# Patient Record
Sex: MALE | Race: WHITE | NOT HISPANIC OR LATINO | Employment: OTHER | URBAN - METROPOLITAN AREA
[De-identification: names, ages, dates, MRNs, and addresses within clinical notes are randomized per-mention and may not be internally consistent; named-entity substitution may affect disease eponyms.]

---

## 2017-04-03 ENCOUNTER — TELEPHONE (OUTPATIENT)
Dept: NEUROSURGERY | Facility: CLINIC | Age: 67
End: 2017-04-03

## 2017-04-03 ENCOUNTER — OFFICE VISIT (OUTPATIENT)
Dept: NEUROSURGERY | Facility: CLINIC | Age: 67
End: 2017-04-03
Payer: MEDICARE

## 2017-04-03 VITALS
WEIGHT: 177.63 LBS | SYSTOLIC BLOOD PRESSURE: 116 MMHG | DIASTOLIC BLOOD PRESSURE: 76 MMHG | HEIGHT: 72 IN | TEMPERATURE: 98 F | BODY MASS INDEX: 24.06 KG/M2 | HEART RATE: 60 BPM

## 2017-04-03 DIAGNOSIS — M48.061 LUMBAR STENOSIS: Primary | ICD-10-CM

## 2017-04-03 DIAGNOSIS — M47.22 CERVICAL SPONDYLOSIS WITH RADICULOPATHY: ICD-10-CM

## 2017-04-03 DIAGNOSIS — M47.26 OSTEOARTHRITIS OF SPINE WITH RADICULOPATHY, LUMBAR REGION: ICD-10-CM

## 2017-04-03 PROBLEM — M47.816 SPONDYLOSIS OF LUMBAR REGION WITHOUT MYELOPATHY OR RADICULOPATHY: Status: ACTIVE | Noted: 2017-04-03

## 2017-04-03 PROCEDURE — 99204 OFFICE O/P NEW MOD 45 MIN: CPT | Mod: S$PBB,,, | Performed by: NEUROLOGICAL SURGERY

## 2017-04-03 PROCEDURE — 99203 OFFICE O/P NEW LOW 30 MIN: CPT | Mod: PBBFAC | Performed by: NEUROLOGICAL SURGERY

## 2017-04-03 PROCEDURE — 99999 PR PBB SHADOW E&M-NEW PATIENT-LVL III: CPT | Mod: PBBFAC,,, | Performed by: NEUROLOGICAL SURGERY

## 2017-04-03 RX ORDER — MELOXICAM 15 MG/1
15 TABLET ORAL DAILY
COMMUNITY

## 2017-04-03 RX ORDER — DICLOFENAC SODIUM 10 MG/G
4 GEL TOPICAL 4 TIMES DAILY PRN
COMMUNITY

## 2017-04-03 RX ORDER — PRAZOSIN HYDROCHLORIDE 2 MG/1
1 CAPSULE ORAL 2 TIMES DAILY
COMMUNITY
End: 2018-03-29

## 2017-04-03 RX ORDER — GABAPENTIN 300 MG/1
300 CAPSULE ORAL 3 TIMES DAILY
COMMUNITY

## 2017-04-03 RX ORDER — CLONAZEPAM 1 MG/1
1 TABLET ORAL 2 TIMES DAILY PRN
COMMUNITY

## 2017-04-03 RX ORDER — ESCITALOPRAM OXALATE 10 MG/1
10 TABLET ORAL DAILY
COMMUNITY

## 2017-04-03 RX ORDER — HYDROCODONE BITARTRATE AND ACETAMINOPHEN 10; 325 MG/1; MG/1
1 TABLET ORAL
COMMUNITY

## 2017-04-03 RX ORDER — CLOTRIMAZOLE AND BETAMETHASONE DIPROPIONATE 10; .5 MG/ML; MG/ML
LOTION TOPICAL
COMMUNITY
End: 2018-09-08

## 2017-04-03 RX ORDER — ATORVASTATIN CALCIUM 40 MG/1
40 TABLET, FILM COATED ORAL DAILY
COMMUNITY

## 2017-04-03 NOTE — PROGRESS NOTES
CHIEF COMPLAINT:  Low back pain and neck pain.    HISTORY OF PRESENT ILLNESS:  Mr. Esposito is a 66-year-old male who was referred   to me by Dr. Enoc Whitehead.  His past medical history is significant for   hyperlipidemia, peripheral vascular disease and heart disease.  He complains of   a longstanding history of both neck pain and back pain.  His back pain started   first, this was mainly back pain and left leg pain.  He underwent two   microdiskectomies in 1978 and 1982.  He really has not had any problem with his   back since that time; however, over the past several years after a slip and fall   in the bathroom, he complains of progressive back pain with radiation into his   right hip and down his right leg.  His leg pain is in a nondermatomal   distribution.  He also complains of neck pain radiating into his right shoulder   and down into his right hand.  Any type of movement makes the pain worse.  He is   in pain, whether he is lying down, sitting, standing, walking, bending forward,   bending backwards or rotating his neck.  Pain medication does seem to make his   pain better.  He also uses heating pads and a TENS unit, which provides him with   some relief.  He has tried physical therapy in the past.  He thinks that pool   therapy has been most effective.  He had epidural steroid injections prior to   his past lumbar surgeries, but has not had any recent epidural steroid   injections.    His past medical history, surgical history, family history, social history and   10-point review of systems are as per the Neurosurgery intake form, which I   reviewed.    MEDICATIONS AND ALLERGIES:  As documented in EPIC.    PHYSICAL EXAMINATION:  VITAL SIGNS:  Today, his blood pressure is 116/76, pulse of 60, temperature of   98.2, weight of 177, height of 6 feet with a BMI of 24.09.  His pain score is   7/10 and located in his back.  GENERAL:  He is well developed, well groomed.  He appears to be in a moderate   amount of  pain while sitting in the exam room chair today.  NEUROLOGIC:  He is oriented to person, place and time.  MUSCULOSKELETAL:  He has good tone in both upper and lower extremities without   any evidence of atrophy.  He has 5/5 motor strength throughout bilateral upper   and lower extremities including the deltoid, bicep, tricep, wrist extensor,   wrist flexor, hand intrinsic, iliopsoas, quadriceps, hamstring, gastrocnemius,   tibialis anterior and extensor hallucis longus.  His sensation is intact to   light touch bilaterally throughout all distributions.  He has no Jeremi's and   no clonus.    IMAGING:  He has an MRI of both the cervical spine and lumbar spine available   for review, which I personally reviewed.  The MRI of the cervical spine shows   good alignment of the cervical spine.  There is multilevel mild cervical   spondylosis with mild at best multilevel stenosis and neural foraminal   narrowing.  There is no specific cervical spine finding that correlates to the   patient's distribution of pain.  The MRI of the lumbar spine also overall shows   good alignment.  It shows evidence of a previous laminectomy at L5 and S1.    There is no significant central canal narrowing or neural foraminal narrowing   throughout specifically.  No neural foraminal narrowing on the right side, which   is the side of the patient's pain.    IMPRESSION AND PLAN:  Mr. Esposito is a 66-year-old male who complains of a   longstanding history of neck pain and back pain as described above.  His imaging   studies are as described above.  From a neurosurgical standpoint, I do not see   any reason in either his neck or his back that accounts for the pain that he is   describing to me.  Therefore, I do not believe that he is a surgical candidate   at this time.  I do believe that he would benefit from interventional pain   management.  Therefore, I have given him a referral to Dr. Dina Scanlon for   consideration for either epidural  steroid injections or perhaps even a spinal   cord stimulator trial.  I do believe he is a good candidate for STIM trial.  At   this point, there is no need for any further neurosurgical followup.  The   patient knows he can call with any further questions or concerns.  Otherwise, I   will turn his care over to Pain Management and return him to the care of his   primary care physician.      CATALINA  dd: 04/07/2017 14:44:49 (CDT)  td: 04/08/2017 10:15:22 (CDT)  Doc ID   #[id number]  Job ID #[id number]    CC:       Dictation #: [id number]

## 2017-04-03 NOTE — MR AVS SNAPSHOT
[facility] - Neurosurgery 7th Fl  [address]  [facility] [address]  Phone: [phone]                  Hany Esposito   4/3/2017 9:00 AM   Office Visit    Description:  Male : 1950   Provider:  Alise Ceja MD   Department:  [facility] - Neurosurgery 7th Fl           Reason for Visit     Lumbar Spine Pain (L-Spine)     Cervical Spine Pain (C-spine)                To Do List           Future Appointments        Provider Department Dept Phone    2017 8:30 AM Janee Scanlon MD [facility] - Pain Management [phone]      Goals (5 Years of Data)     None      [facility]     [facility] On Call Nurse Care Line -  Assistance  Unless otherwise directed by your provider, please contact [facility], our nurse care line that is available for  assistance.     Registered nurses in the [facility] On Call Center provide: appointment scheduling, clinical advisement, health education, and other advisory services.  Call: [phone] (toll free)               Medications           Message regarding Medications     Verify the changes and/or additions to your medication regime listed below are the same as discussed with your clinician today.  If any of these changes or additions are incorrect, please notify your healthcare provider.             Verify that the below list of medications is an accurate representation of the medications you are currently taking.  If none reported, the list may be blank. If incorrect, please contact your healthcare provider. Carry this list with you in case of emergency.           Current Medications     atorvastatin (LIPITOR) 40 MG tablet Take 40 mg by mouth once daily.    clonazePAM (KLONOPIN) 1 MG tablet Take 1 mg by mouth 2 (two) times daily as needed for Anxiety.    clotrimazole-betamethasone (LOTRISONE) lotion Apply topically as needed.    diclofenac sodium 1 % Gel Apply 4 g topically 4 (four) times daily as needed.    escitalopram oxalate (LEXAPRO) 10 MG  tablet Take 10 mg by mouth once daily.    gabapentin (NEURONTIN) 300 MG capsule Take 300 mg by mouth 3 (three) times daily.    hydrocodone-acetaminophen 7.5-325mg (NORCO) 7.5-325 mg per tablet Take 1 tablet by mouth every 6 to 8 hours as needed for Pain.    meloxicam (MOBIC) 15 MG tablet Take 15 mg by mouth once daily.    prazosin (MINIPRESS) 2 MG Cap Take 1 mg by mouth 2 (two) times daily.           Clinical Reference Information           Your Vitals Were     BP Pulse Temp Height Weight BMI    116/76 60 98.2 °F (36.8 °C) (Oral) 6' (1.829 m) 80.6 kg (177 lb 9.6 oz) 24.09 kg/m2      Blood Pressure          Most Recent Value    BP  116/76      Allergies as of 4/3/2017     No Known Allergies      Immunizations Administered on Date of Encounter - 4/3/2017     None      Smoking Cessation     If you would like to quit smoking:   You may be eligible for free services if you are a [place] resident and started smoking cigarettes before September 1, 1988.  Call the Smoking Cessation Trust ([facility]) toll free at [phone] or [phone].   Call 1-800-QUIT-NOW if you do not meet the above criteria.   Contact us via email: [email or web address]   View our website for more information: [email or web address]        Language Assistance Services     ATTENTION: Language assistance services are available, free of charge. Please call [phone].      ATENCIÓN: Si habla español, tiene a hoang disposición servicios gratuitos de asistencia lingüística. Llchiqui al [phone].     [facility] Ý: N?u b?n nói Ti?ng Vi?t, có các d?ch v? h? tr? ngôn ng? mi?n phí dành cho b?n. G?i s? [phone].         [facility] [address] complies with applicable Federal civil rights laws and does not discriminate on the basis of race, color, national origin, age, disability, or sex.

## 2017-04-03 NOTE — LETTER
April 7, 2017      Enoc Whitehead MD  [address]  [place] [address]           [facility] - Neurosurgery 7th Fl  [address]  [facility] [address]  Phone: [phone]          Patient: Hany Esposito   MR Number: [id number]   YOB: 1950   Date of Visit: 4/3/2017       Dear Dr. Enoc Whitehead:    Thank you for referring Hany Esposito to me for evaluation. Attached you will find relevant portions of my assessment and plan of care.    If you have questions, please do not hesitate to call me. I look forward to following Hany Esposito along with you.    Sincerely,    Alise Ceja MD    Enclosure  CC:  No Recipients    If you would like to receive this communication electronically, please contact [email or web address] or [phone] to request more information on [employer] Link access.    For providers and/or their staff who would like to refer a patient to [facility], please contact us through our one-stop-shop provider referral line, [facility] , at [phone].    If you feel you have received this communication in error or would no longer like to receive these types of communications, please e-mail [email or web address]

## 2018-03-29 ENCOUNTER — OFFICE VISIT (OUTPATIENT)
Dept: PAIN MEDICINE | Facility: CLINIC | Age: 68
End: 2018-03-29
Attending: ANESTHESIOLOGY
Payer: MEDICARE

## 2018-03-29 VITALS
DIASTOLIC BLOOD PRESSURE: 73 MMHG | BODY MASS INDEX: 24.23 KG/M2 | SYSTOLIC BLOOD PRESSURE: 110 MMHG | HEART RATE: 70 BPM | WEIGHT: 178.88 LBS | HEIGHT: 72 IN

## 2018-03-29 DIAGNOSIS — M54.12 CERVICAL RADICULOPATHY: ICD-10-CM

## 2018-03-29 DIAGNOSIS — M48.061 LUMBAR FORAMINAL STENOSIS: ICD-10-CM

## 2018-03-29 DIAGNOSIS — M47.26 OSTEOARTHRITIS OF SPINE WITH RADICULOPATHY, LUMBAR REGION: ICD-10-CM

## 2018-03-29 DIAGNOSIS — M51.36 DDD (DEGENERATIVE DISC DISEASE), LUMBAR: Primary | ICD-10-CM

## 2018-03-29 DIAGNOSIS — M54.16 LUMBAR RADICULOPATHY: ICD-10-CM

## 2018-03-29 DIAGNOSIS — M50.30 DDD (DEGENERATIVE DISC DISEASE), CERVICAL: ICD-10-CM

## 2018-03-29 PROCEDURE — 99999 PR PBB SHADOW E&M-EST. PATIENT-LVL III: CPT | Mod: PBBFAC,,, | Performed by: ANESTHESIOLOGY

## 2018-03-29 PROCEDURE — 99204 OFFICE O/P NEW MOD 45 MIN: CPT | Mod: S$GLB,,, | Performed by: ANESTHESIOLOGY

## 2018-03-29 NOTE — LETTER
March 29, 2018      Soraida Vang MD  [facility] [facility] [address]  [facility] [address]           [facility] - Pain Management  [address], [facility] [address]  [facility] [address]  Phone: [phone]  Fax: [phone]          Patient: Hany Esposito   MR Number: [id number]   YOB: 1950   Date of Visit: 3/29/2018       Dear Dr. Soraida Vang:    Thank you for referring Hany Esposito to me for evaluation. Attached you will find relevant portions of my assessment and plan of care.    If you have questions, please do not hesitate to call me. I look forward to following Hany Esposito along with you.    Sincerely,    Kamar Haile III, MD    Enclosure  CC:  No Recipients    If you would like to receive this communication electronically, please contact [email or web address] or [phone] to request more information on [employer] Link access.    For providers and/or their staff who would like to refer a patient to [facility], please contact us through our one-stop-shop provider referral line, [facility], at [phone].    If you feel you have received this communication in error or would no longer like to receive these types of communications, please e-mail [email or web address]

## 2018-03-29 NOTE — PROGRESS NOTES
Chronic Pain - New Consult    Referring Physician: Soraida Vang MD      Chief Complaint   Patient presents with    Neck Pain    Low-back Pain        SUBJECTIVE:    Hany Esposito is a 66 y/o male with hx of lumbar surgery (1978 and 1982) who presents to the clinic for the evaluation of neck and low back pain. The current pain started in April 2015 following a fall in a casino bathroom and symptoms have been worsening. The neck pain radiates into the shoulder and down both arms to the level of the forearm in no particular dermatomal distribution.  The neck pain is described as sharp, stabbing and throbbing and is rated as 6/10. The pain is rated with a score of  3/10 on the AVERAGE day and a score of 8/10 on the WORST day. The neck pain is exacerbated by head turning, neck extension, and laying in bed. He also complains of bilateral low back pain which radiates into the hips and down the back of the legs into the feet. The back pain is described as sharp, stabbing and throbbing and is rated as 7/10. The pain is rated with a score of  4/10 on the AVERAGE day and a score of 10/10 on the WORST day. The back pain is exacerbated by sitting, standing, bending, walking, getting out of bed/chair, twisting and cold weather. The back pain is more significant than the neck pain. Symptoms interfere with daily activity and sleeping.  The pain is mitigated somewhat by heat, pain medication, topical pain cream and TENS unit. He has never tried chiropractic care. He tried aquatic therapy which helped after the injury. He reports spending 18 hours per day reclining. The patient reports 3 hours of uninterrupted sleep per night. The patient tells me this is an open  case. He has been evaluated by neurosurgery last year and it was not felt surgery was necessary.    Patient denies bowel/bladder incontinence. He reports subjective weakness in the legs and numbness in both feet.       Pain Disability Index Review:  Last 3  PDI Scores 3/29/2018   Pain Disability Index (PDI) 52       Pain Medications:    - Norco 10/325 QID  - Gabapentin 300 mg TID  - Mobic 15 mg daily  - Clonazepam 1 mg BID     report:  Reviewed     Pain Procedures: None     Imaging:     Lumbar MRI (10/17/2017):    1) Stable moderate compression deformity involving the superior endplate of L1 with no new or subacute compression deformities.  2) Mild to moderate facet arthropathy L3-4 without significant foraminal restriction.  3) Moderate lumbar stenosis L4-5 predominately from hypertrophic facet arthopathy. There is moderate bilateral foraminal restriction with mild contact of the exiting nerve root on th left foramen. There is also mild contact of the traversing nerve root on the left. The findings appear more eccentric when compared to the prior study of 5/29/2015.  4) Prominent discogenic disease L5-S1 with prominent facet arthropathy. There is moderate foraminal restriction bilaterally without root contact. These findings appear generally stable when compared to the prior study.     Cervical MRI (5/29/2015):     1) Exaggerated cervical lordosis is noted which is within normal limits without acute cervical vertebral body fracture, compression fracture deformity or spondylolisthesis. Low-grade discogenic disease is present mainly at C4-5, C5-6 and C6-7 with low-grade uncovertebral joint hypertrophy at C5-6, and C6-7. No central canal stenosis is present.  2) Mild bilateral neural foraminal narrowing is present at C5-6 and C6-7 with minimal bilateral neural foraminal narrowing at C3-4.    Past Medical History:   Diagnosis Date    Depression     Hyperlipidemia      Past Surgical History:   Procedure Laterality Date    SPINE SURGERY  1978 and 1982     Social History     Social History    Marital status:      Spouse name: N/A    Number of children: 2    Years of education: N/A     Occupational History    Not on file.     Social History Main Topics     Smoking status: Current Every Day Smoker     Packs/day: 8.00     Types: Cigarettes    Smokeless tobacco: Not on file    Alcohol use Not on file    Drug use: Unknown    Sexual activity: Not on file     Other Topics Concern    Not on file     Social History Narrative    Twin Girls      Family History   Problem Relation Age of Onset    Cancer Mother     Lung cancer Mother     Brain cancer Mother     Clotting disorder Father     Heart disease Father     Heart disease Brother     Heart disease Brother     Alcohol abuse Brother     Alcohol abuse Brother        Review of patient's allergies indicates:  No Known Allergies    Current Outpatient Prescriptions   Medication Sig    atorvastatin (LIPITOR) 40 MG tablet Take 40 mg by mouth once daily.    clonazePAM (KLONOPIN) 1 MG tablet Take 1 mg by mouth 2 (two) times daily as needed for Anxiety.    clotrimazole-betamethasone (LOTRISONE) lotion Apply topically as needed.    diclofenac sodium 1 % Gel Apply 4 g topically 4 (four) times daily as needed.    escitalopram oxalate (LEXAPRO) 10 MG tablet Take 10 mg by mouth once daily.    gabapentin (NEURONTIN) 300 MG capsule Take 300 mg by mouth 3 (three) times daily.    hydrocodone-acetaminophen 10-325mg (NORCO)  mg Tab Take 1 tablet by mouth every 6 to 8 hours as needed for Pain.    meloxicam (MOBIC) 15 MG tablet Take 15 mg by mouth once daily.     No current facility-administered medications for this visit.        REVIEW OF SYSTEMS:    GENERAL:  No weight loss, malaise or fevers.  HEENT: + headaches  NECK:  + neck pain  RESPIRATORY:  Negative for wheezing or shortness of breath.  CARDIOVASCULAR:  Negative for chest pain or palpitations.  GI:  Negative for abdominal discomfort, blood in stools or black stools or change in bowel habits.  MUSCULOSKELETAL:  See HPI.  SKIN:  Negative for lesions, rash, and itching.  PSYCH:  + sleep disturbance  HEMATOLOGY/LYMPHOLOGY:  Negative for prolonged bleeding, bruising  easily or swollen nodes.  NEURO:   No history of paralysis, seizures or tremors.  All other reviewed and negative other than HPI.    OBJECTIVE:    /73 (BP Location: Left arm, Patient Position: Sitting)   Pulse 70   Ht 6' (1.829 m)   Wt 81.1 kg (178 lb 14.4 oz)   BMI 24.26 kg/m²     PHYSICAL EXAMINATION:    GENERAL: Well appearing, in no acute distress.   PSYCH:  Mood and affect is appropriate.  Awake, alert, and oriented x 3.  SKIN: Skin color, texture, turgor normal, no rashes or lesions  HEENT: Normocephalic, atraumatic.  EOM intact.  CV: Radial pulses are 2+.  RESP:  Respirations are unlabored.  GI: Abdomen soft and non-tender.  MSK:  No atrophy or tone abnormalities are noted.                 Neck: Tenderness to palpation over the cervical paraspinous muscles. Pain with neck flexion, extension, and lateral rotation.  No obvious deformity or signs of trauma.  ROM is limited.     Back: Straight leg raising is positive for radicular pain. Tenderness to palpation over the lumbar spine and paraspinous muscles.  + pain with facet loading and back extension/rotation. Decreased range of motion on flexion and extension.     Buttocks:  Pain to palpation over the PSIS.     Extremities:  Peripheral joint ROM is full and pain free without obvious instability or laxity in all four extremities. No edema or skin discolorations noted.      Gait:  Gait is antalgic, uses cane.     NEUR:  Bilateral lower extremity coordination and muscle stretch reflexes are physiologic and symmetric. Strength testing is fair and symmetric throughout all muscle groups in the upper and lower extremities. No loss of sensation is noted.       ASSESSMENT: 67 y.o.  male with chronic low back pain which radiates down the bilateral lower etxremities (R>L), consistent with discogenic pain and radiculitis. Also with chronic neck pain that radiates down the bilateral upper extremities. The patient's upper extremity symptoms do not correlate with  cervical MRI findings.    1. Osteoarthritis of spine with radiculopathy, lumbar region    2. Lumbar foraminal stenosis    3. DDD (degenerative disc disease), lumbar    4. DDD (degenerative disc disease), cervical    5. Cervical radiculopathy        PLAN:     - I have stressed the importance of physical activity and a home exercise plan to help with pain and improve health.  - Order Lumbar x-rays in flexion/extension views.  - Will update MRI of the Cervical Spine.  - Schedule for a Right Transforaminal epidural steroid injection at L4 and L5 to help with his pain and progress with a home exercise plan.  - Counseled patient regarding the importance of smoking cessation.  - RTC after procedure.    The above plan and management options were discussed at length with patient. Patient is in agreement with the above and verbalized understanding. It will be communicated with the referring physician via electronic record, fax, or mail.    Kamar Haile III  03/29/2018

## 2018-04-09 ENCOUNTER — TELEPHONE (OUTPATIENT)
Dept: PAIN MEDICINE | Facility: CLINIC | Age: 68
End: 2018-04-09

## 2018-04-09 NOTE — TELEPHONE ENCOUNTER
Called and informed pt that the order is in, it's just pending authorization and that his appt for his MRI is not until 4/9/18. Understanding verbalized.

## 2018-04-19 PROBLEM — M51.36 DDD (DEGENERATIVE DISC DISEASE), LUMBAR: Status: ACTIVE | Noted: 2018-04-19

## 2018-05-10 ENCOUNTER — OFFICE VISIT (OUTPATIENT)
Dept: PAIN MEDICINE | Facility: CLINIC | Age: 68
End: 2018-05-10
Attending: ANESTHESIOLOGY
Payer: MEDICARE

## 2018-05-10 VITALS
HEIGHT: 72 IN | HEART RATE: 57 BPM | SYSTOLIC BLOOD PRESSURE: 122 MMHG | WEIGHT: 180.38 LBS | BODY MASS INDEX: 24.43 KG/M2 | DIASTOLIC BLOOD PRESSURE: 76 MMHG

## 2018-05-10 DIAGNOSIS — M51.36 DDD (DEGENERATIVE DISC DISEASE), LUMBAR: ICD-10-CM

## 2018-05-10 DIAGNOSIS — M48.061 LUMBAR FORAMINAL STENOSIS: ICD-10-CM

## 2018-05-10 DIAGNOSIS — M50.30 DDD (DEGENERATIVE DISC DISEASE), CERVICAL: Primary | ICD-10-CM

## 2018-05-10 DIAGNOSIS — M47.22 OSTEOARTHRITIS OF SPINE WITH RADICULOPATHY, CERVICAL REGION: ICD-10-CM

## 2018-05-10 DIAGNOSIS — M54.16 LUMBAR RADICULOPATHY: ICD-10-CM

## 2018-05-10 PROCEDURE — 99214 OFFICE O/P EST MOD 30 MIN: CPT | Mod: S$GLB,,, | Performed by: ANESTHESIOLOGY

## 2018-05-10 PROCEDURE — 99999 PR PBB SHADOW E&M-EST. PATIENT-LVL III: CPT | Mod: PBBFAC,,, | Performed by: ANESTHESIOLOGY

## 2018-05-10 NOTE — PROGRESS NOTES
Chronic Pain - Established    INTERVAL HISTORY (05/10/2018):    Hany Esposito presents to the clinic today for a follow-up appointment for low back and neck pain. Since the last visit, the low back pain has been persistent. The patient reports no improvement after Right Transforaminal epidural steroid injection at L4 and L5. Current pain intensity in the low back is 6/10. He continues to experience pain in the low back that radiates down the back of both legs. The pain has been present since a [place].    He also continues to experience significant pain in the neck that radiates down both arms in no particular dermatomal distribution. Current pain intensity in the neck is 3-4/10. The pain is worse at nighttime. He continues to use a TENS unit on his neck and low back which helps.     SUBJECTIVE:    Hany Esposito is a 66 y/o male with hx of lumbar surgery (1978 and 1982) who presents to the clinic for the evaluation of neck and low back pain. The current pain started in April 2015 following a fall [place] a [place] and symptoms have been worsening. The neck pain radiates into the shoulder and down both arms to the level of the forearm in no particular dermatomal distribution.  The neck pain is described as sharp, stabbing and throbbing and is rated as 6/10. The pain is rated with a score of  3/10 on the AVERAGE day and a score of 8/10 on the WORST day. The neck pain is exacerbated by head turning, neck extension, and laying in bed. He also complains of bilateral low back pain which radiates into the hips and down the back of the legs into the feet. The back pain is described as sharp, stabbing and throbbing and is rated as 7/10. The pain is rated with a score of  4/10 on the AVERAGE day and a score of 10/10 on the WORST day. The back pain is exacerbated by sitting, standing, bending, walking, getting out of bed/chair, twisting and cold weather. The back pain is more significant than the neck  pain. Symptoms interfere with daily activity and sleeping.  The pain is mitigated somewhat by heat, pain medication, topical pain cream and TENS unit. He has never tried chiropractic care. He tried aquatic therapy which helped after the injury. He reports spending 18 hours per day reclining. The patient reports 3 hours of uninterrupted sleep per night. The patient tells me this is an open  case. He has been evaluated by neurosurgery last year and it was not felt surgery was necessary.    Patient denies bowel/bladder incontinence. He reports subjective weakness in the legs and numbness in both feet.       Pain Disability Index Review:  Last 3 PDI Scores 5/10/2018 3/29/2018   Pain Disability Index (PDI) 45 52       Pain Medications:    - Norco 10/325 QID  - Gabapentin 300 mg TID  - Mobic 15 mg daily  - Clonazepam 1 mg BID     report:  Reviewed     Pain Procedures: None     Imaging:     Cervical MRI (4/12/2018):    FINDINGS:  Alignment: There is an accentuated cervical lordosis.    Vertebrae: Normal marrow signal. No fracture.    Discs: There is diffuse disc desiccation with narrowing    Cord: Normal.    Skull base and craniocervical junction: Normal.    Degenerative findings:    C2-C3: There is disc desiccation.  There is no evidence of herniation, spinal canal stenosis.  The neural foramen or normal.    C3-C4: There is disc desiccation.  There is a small central uncovertebral bony spur which encroaches upon the anterior thecal sac.  There is normal spinal canal stenosis.  Normal foramina.    C4-C5: The disc signal is abnormal with desiccation.  There is a circumferential bulging disc which encroaches upon the anterior thecal sac.  There is no spinal cord compression.  There is mild facet hypertrophy.  Slight narrowing is noted of the left neural foramen.    C5-C6: There is evidence of disc desiccation.  There is a circumferential bulging disc which encroaches upon the anterior thecal sac. There is no  spinal cord compression. There is no spinal canal stenosis.  The foramen are normal.  Mild facet hypertrophy is noted.    C6-C7: There is disc desiccation.  A small uncovertebral bony spur encroaches upon the anterior thecal sac.  There is no spinal cord compression.  No spinal canal stenosis.  The foramina are normal.    C7-T1: The disc signal is normal.  There is now evidence of herniation or spinal canal stenosis.  Normal foramina.    Paraspinal muscles & soft tissues: Unremarkable.    Lumbar MRI (10/17/2017):    1) Stable moderate compression deformity involving the superior endplate of L1 with no new or subacute compression deformities.  2) Mild to moderate facet arthropathy L3-4 without significant foraminal restriction.  3) Moderate lumbar stenosis L4-5 predominately from hypertrophic facet arthopathy. There is moderate bilateral foraminal restriction with mild contact of the exiting nerve root on th left foramen. There is also mild contact of the traversing nerve root on the left. The findings appear more eccentric when compared to the prior study of 5/29/2015.  4) Prominent discogenic disease L5-S1 with prominent facet arthropathy. There is moderate foraminal restriction bilaterally without root contact. These findings appear generally stable when compared to the prior study.     Cervical MRI (5/29/2015):     1) Exaggerated cervical lordosis is noted which is within normal limits without acute cervical vertebral body fracture, compression fracture deformity or spondylolisthesis. Low-grade discogenic disease is present mainly at C4-5, C5-6 and C6-7 with low-grade uncovertebral joint hypertrophy at C5-6, and C6-7. No central canal stenosis is present.  2) Mild bilateral neural foraminal narrowing is present at C5-6 and C6-7 with minimal bilateral neural foraminal narrowing at C3-4.    Past Medical History:   Diagnosis Date    Back pain     Coronary artery disease     DDD (degenerative disc disease),  cervical 2018    DDD (degenerative disc disease), lumbar 2018    Depression     Hyperlipidemia     Myocardial infarction 2016    2 heart stents     Past Surgical History:   Procedure Laterality Date    CHOLECYSTECTOMY      CORONARY STENT PLACEMENT      Nov 11    HERNIA REPAIR      SPINE SURGERY  1978 and 1982    x2 no hardware     Social History     Social History    Marital status:      Spouse name: N/A    Number of children: 2    Years of education: N/A     Occupational History    Not on file.     Social History Main Topics    Smoking status: Current Every Day Smoker     Packs/day: 0.25     Types: Cigarettes    Smokeless tobacco: Not on file    Alcohol use No    Drug use: No    Sexual activity: Not on file     Other Topics Concern    Not on file     Social History Narrative    Twin Girls      Family History   Problem Relation Age of Onset    Cancer Mother     Lung cancer Mother     Brain cancer Mother     Clotting disorder Father     Heart disease Father     Heart disease Brother     Heart disease Brother     Alcohol abuse Brother     Alcohol abuse Brother        Review of patient's allergies indicates:  No Known Allergies    Current Outpatient Prescriptions   Medication Sig    atorvastatin (LIPITOR) 40 MG tablet Take 40 mg by mouth once daily.    clonazePAM (KLONOPIN) 1 MG tablet Take 1 mg by mouth 2 (two) times daily as needed for Anxiety.    clotrimazole-betamethasone (LOTRISONE) lotion Apply topically as needed.    diclofenac sodium 1 % Gel Apply 4 g topically 4 (four) times daily as needed.    escitalopram oxalate (LEXAPRO) 10 MG tablet Take 10 mg by mouth once daily.    gabapentin (NEURONTIN) 300 MG capsule Take 300 mg by mouth 3 (three) times daily.    hydrocodone-acetaminophen 10-325mg (NORCO)  mg Tab Take 1 tablet by mouth every 6 to 8 hours as needed for Pain.    meloxicam (MOBIC) 15 MG tablet Take 15 mg by mouth once daily.     No current  facility-administered medications for this visit.        REVIEW OF SYSTEMS:    GENERAL:  No weight loss, malaise or fevers.  HEENT: + headaches  NECK:  + neck pain  RESPIRATORY:  Negative for wheezing or shortness of breath.  CARDIOVASCULAR:  Negative for chest pain or palpitations.  GI:  Negative for abdominal discomfort, blood in stools or black stools or change in bowel habits.  MUSCULOSKELETAL:  See HPI.  SKIN:  Negative for lesions, rash, and itching.  PSYCH:  + sleep disturbance  HEMATOLOGY/LYMPHOLOGY:  Negative for prolonged bleeding, bruising easily or swollen nodes.  NEURO:   No history of paralysis, seizures or tremors.  All other reviewed and negative other than HPI.    OBJECTIVE:    /76 (BP Location: Left arm, Patient Position: Sitting)   Pulse (!) 57   Ht 6' (1.829 m)   Wt 81.8 kg (180 lb 6.4 oz)   BMI 24.47 kg/m²     PHYSICAL EXAMINATION:    GENERAL: Well appearing, in no acute distress.   PSYCH:  Mood and affect is appropriate.  Awake, alert, and oriented x 3.  SKIN: Skin color, texture, turgor normal, no rashes or lesions  HEENT: Normocephalic, atraumatic.  EOM intact.  CV: Radial pulses are 2+.  RESP:  Respirations are unlabored.  GI: Abdomen soft and non-tender.  MSK:  No atrophy or tone abnormalities are noted.                 Neck: Tenderness to palpation over the cervical paraspinous muscles. Pain with neck flexion, extension, and lateral rotation.  No obvious deformity or signs of trauma.  ROM is limited.     Back: Tenderness to palpation over the lumbar spine and paraspinous muscles.  + pain with facet loading and back extension/rotation. Decreased range of motion on flexion and extension.     Extremities:  Peripheral joint ROM is full and pain free without obvious instability or laxity in all four extremities. No edema or skin discolorations noted.      Gait:  Gait is antalgic, uses cane.     NEUR:  Strength testing is fair and symmetric throughout all muscle groups in the upper and  lower extremities. No loss of sensation is noted.     ASSESSMENT: 67 y.o.  male with chronic low back pain which radiates down the bilateral lower etxremities, consistent with discogenic pain and radiculitis. Also with chronic neck pain that radiates down the bilateral upper extremities. Cervical MRI reveals no significant central or foraminal stenosis.    1. Osteoarthritis of spine with radiculopathy, cervical region    2. DDD (degenerative disc disease), cervical    3. Lumbar radiculopathy    4. DDD (degenerative disc disease), lumbar    5. Lumbar foraminal stenosis        PLAN:     - I have stressed the importance of physical activity and a home exercise plan to help with pain and improve health.  - Schedule for Cervical interlaminar epidural steroid injection at C7-T1 to help with cervical radicular pain.  - Will consider caudal KAILASH in the future for patient's low back and lumbar radicular pain.  - Will also consider SCS trial.  - Counseled patient regarding the importance of smoking cessation.  - RTC after procedure.    The above plan and management options were discussed at length with patient. Patient is in agreement with the above and verbalized understanding. It will be communicated with the referring physician via electronic record, fax, or mail.    Kamar Haile III  05/10/2018

## 2018-05-11 PROBLEM — M50.30 DDD (DEGENERATIVE DISC DISEASE), CERVICAL: Status: ACTIVE | Noted: 2018-05-11

## 2018-05-14 ENCOUNTER — TELEPHONE (OUTPATIENT)
Dept: PAIN MEDICINE | Facility: CLINIC | Age: 68
End: 2018-05-14

## 2018-05-14 NOTE — TELEPHONE ENCOUNTER
R/t pt's phone call and cancelled procedure scheduled for this Friday as requested. Will call back to reschedule.

## 2018-05-15 DIAGNOSIS — M54.12 CERVICAL RADICULITIS: ICD-10-CM

## 2018-05-15 DIAGNOSIS — M54.12 CERVICAL RADICULOPATHY: Primary | ICD-10-CM

## 2018-06-01 ENCOUNTER — HOSPITAL ENCOUNTER (OUTPATIENT)
Facility: HOSPITAL | Age: 68
Discharge: HOME | End: 2018-06-01
Attending: ANESTHESIOLOGY | Admitting: ANESTHESIOLOGY
Payer: MEDICARE

## 2018-06-01 VITALS
TEMPERATURE: 97 F | SYSTOLIC BLOOD PRESSURE: 113 MMHG | OXYGEN SATURATION: 96 % | RESPIRATION RATE: 18 BRPM | BODY MASS INDEX: 24.11 KG/M2 | HEART RATE: 60 BPM | WEIGHT: 178 LBS | HEIGHT: 72 IN | DIASTOLIC BLOOD PRESSURE: 70 MMHG

## 2018-06-01 DIAGNOSIS — M50.30 DDD (DEGENERATIVE DISC DISEASE), CERVICAL: ICD-10-CM

## 2018-06-01 DIAGNOSIS — M54.12 CERVICAL RADICULOPATHY: ICD-10-CM

## 2018-06-01 DIAGNOSIS — M54.12 CERVICAL RADICULITIS: Primary | ICD-10-CM

## 2018-06-01 PROCEDURE — 62321 NJX INTERLAMINAR CRV/THRC: CPT | Mod: ,,, | Performed by: ANESTHESIOLOGY

## 2018-06-01 PROCEDURE — 25000003 PHARM REV CODE 250

## 2018-06-01 PROCEDURE — 99152 MOD SED SAME PHYS/QHP 5/>YRS: CPT

## 2018-06-01 PROCEDURE — 99152 MOD SED SAME PHYS/QHP 5/>YRS: CPT | Mod: ,,, | Performed by: ANESTHESIOLOGY

## 2018-06-01 PROCEDURE — 63600175 PHARM REV CODE 636 W HCPCS

## 2018-06-01 NOTE — DISCHARGE SUMMARY
Discharge Note  Short Stay      SUMMARY     Admit Date: 6/1/2018    Attending Physician: Kamar Haile III      Discharge Physician: Kamar Haile III      Discharge Date: 6/1/2018 12:25 PM    Final Diagnosis:   1) Cervical Radiculitis  2) Cervical DDD    Disposition: Home or self care    Patient Instructions:   Discharge Medication List as of 6/1/2018 11:51 AM      CONTINUE these medications which have NOT CHANGED    Details   atorvastatin (LIPITOR) 40 MG tablet Take 40 mg by mouth once daily., Until Discontinued, Historical Med      clonazePAM (KLONOPIN) 1 MG tablet Take 1 mg by mouth 2 (two) times daily as needed for Anxiety., Until Discontinued, Historical Med      escitalopram oxalate (LEXAPRO) 10 MG tablet Take 10 mg by mouth once daily., Until Discontinued, Historical Med      gabapentin (NEURONTIN) 300 MG capsule Take 300 mg by mouth 3 (three) times daily., Until Discontinued, Historical Med      hydrocodone-acetaminophen 10-325mg (NORCO)  mg Tab Take 1 tablet by mouth every 6 to 8 hours as needed for Pain., Historical Med      meloxicam (MOBIC) 15 MG tablet Take 15 mg by mouth once daily., Until Discontinued, Historical Med      clotrimazole-betamethasone (LOTRISONE) lotion Apply topically as needed., Until Discontinued, Historical Med      diclofenac sodium 1 % Gel Apply 4 g topically 4 (four) times daily as needed., Until Discontinued, Historical Med                 Discharge Diagnosis: Same as Pre and Post Procedure  Condition on Discharge: Stable.  Diet on Discharge: Same as before.  Activity: as per instruction sheet.  Discharge to: Home with a responsible adult.  Follow up:  6/21/2018

## 2018-06-01 NOTE — H&P (VIEW-ONLY)
Chronic Pain - Established    INTERVAL HISTORY (05/10/2018):    Hany Esposito presents to the clinic today for a follow-up appointment for low back and neck pain. Since the last visit, the low back pain has been persistent. The patient reports no improvement after Right Transforaminal epidural steroid injection at L4 and L5. Current pain intensity in the low back is 6/10. He continues to experience pain in the low back that radiates down the back of both legs. The pain has been present [place].    He also continues to experience significant pain in the neck that radiates down both arms in no particular dermatomal distribution. Current pain intensity in the neck is 3-4/10. The pain is worse at nighttime. He continues to use a TENS unit on his neck and low back which helps.     SUBJECTIVE:    Hany Esposito is a 68 y/o male with hx of lumbar surgery (1978 and 1982) who presents to the clinic for the evaluation of neck and low back pain. The current pain started in April 2015 following a [place] and symptoms have been worsening. The neck pain radiates into the shoulder and down both arms to the level of the forearm in no particular dermatomal distribution.  The neck pain is described as sharp, stabbing and throbbing and is rated as 6/10. The pain is rated with a score of  3/10 on the AVERAGE day and a score of 8/10 on the WORST day. The neck pain is exacerbated by head turning, neck extension, and laying in bed. He also complains of bilateral low back pain which radiates into the hips and down the back of the legs into the feet. The back pain is described as sharp, stabbing and throbbing and is rated as 7/10. The pain is rated with a score of  4/10 on the AVERAGE day and a score of 10/10 on the WORST day. The back pain is exacerbated by sitting, standing, bending, walking, getting out of bed/chair, twisting and cold weather. The back pain is more significant than the neck  pain. Symptoms interfere with daily activity and sleeping.  The pain is mitigated somewhat by heat, pain medication, topical pain cream and TENS unit. He has never tried chiropractic care. He tried aquatic therapy which helped after the injury. He reports spending 18 hours per day reclining. The patient reports 3 hours of uninterrupted sleep per night. The patient tells me this is an open  case. He has been evaluated by neurosurgery last year and it was not felt surgery was necessary.    Patient denies bowel/bladder incontinence. He reports subjective weakness in the legs and numbness in both feet.       Pain Disability Index Review:  Last 3 PDI Scores 5/10/2018 3/29/2018   Pain Disability Index (PDI) 45 52       Pain Medications:    - Norco 10/325 QID  - Gabapentin 300 mg TID  - Mobic 15 mg daily  - Clonazepam 1 mg BID     report:  Reviewed     Pain Procedures: None     Imaging:     Cervical MRI (4/12/2018):    FINDINGS:  Alignment: There is an accentuated cervical lordosis.    Vertebrae: Normal marrow signal. No fracture.    Discs: There is diffuse disc desiccation with narrowing    Cord: Normal.    Skull base and craniocervical junction: Normal.    Degenerative findings:    C2-C3: There is disc desiccation.  There is no evidence of herniation, spinal canal stenosis.  The neural foramen or normal.    C3-C4: There is disc desiccation.  There is a small central uncovertebral bony spur which encroaches upon the anterior thecal sac.  There is normal spinal canal stenosis.  Normal foramina.    C4-C5: The disc signal is abnormal with desiccation.  There is a circumferential bulging disc which encroaches upon the anterior thecal sac.  There is no spinal cord compression.  There is mild facet hypertrophy.  Slight narrowing is noted of the left neural foramen.    C5-C6: There is evidence of disc desiccation.  There is a circumferential bulging disc which encroaches upon the anterior thecal sac. There is no  spinal cord compression. There is no spinal canal stenosis.  The foramen are normal.  Mild facet hypertrophy is noted.    C6-C7: There is disc desiccation.  A small uncovertebral bony spur encroaches upon the anterior thecal sac.  There is no spinal cord compression.  No spinal canal stenosis.  The foramina are normal.    C7-T1: The disc signal is normal.  There is now evidence of herniation or spinal canal stenosis.  Normal foramina.    Paraspinal muscles & soft tissues: Unremarkable.    Lumbar MRI (10/17/2017):    1) Stable moderate compression deformity involving the superior endplate of L1 with no new or subacute compression deformities.  2) Mild to moderate facet arthropathy L3-4 without significant foraminal restriction.  3) Moderate lumbar stenosis L4-5 predominately from hypertrophic facet arthopathy. There is moderate bilateral foraminal restriction with mild contact of the exiting nerve root on th left foramen. There is also mild contact of the traversing nerve root on the left. The findings appear more eccentric when compared to the prior study of 5/29/2015.  4) Prominent discogenic disease L5-S1 with prominent facet arthropathy. There is moderate foraminal restriction bilaterally without root contact. These findings appear generally stable when compared to the prior study.     Cervical MRI (5/29/2015):     1) Exaggerated cervical lordosis is noted which is within normal limits without acute cervical vertebral body fracture, compression fracture deformity or spondylolisthesis. Low-grade discogenic disease is present mainly at C4-5, C5-6 and C6-7 with low-grade uncovertebral joint hypertrophy at C5-6, and C6-7. No central canal stenosis is present.  2) Mild bilateral neural foraminal narrowing is present at C5-6 and C6-7 with minimal bilateral neural foraminal narrowing at C3-4.    Past Medical History:   Diagnosis Date    Back pain     Coronary artery disease     DDD (degenerative disc disease),  cervical 2018    DDD (degenerative disc disease), lumbar 2018    Depression     Hyperlipidemia     Myocardial infarction 2016    2 heart stents     Past Surgical History:   Procedure Laterality Date    CHOLECYSTECTOMY      CORONARY STENT PLACEMENT      Nov 11    HERNIA REPAIR      SPINE SURGERY  1978 and 1982    x2 no hardware     Social History     Social History    Marital status:      Spouse name: N/A    Number of children: 2    Years of education: N/A     Occupational History    Not on file.     Social History Main Topics    Smoking status: Current Every Day Smoker     Packs/day: 0.25     Types: Cigarettes    Smokeless tobacco: Not on file    Alcohol use No    Drug use: No    Sexual activity: Not on file     Other Topics Concern    Not on file     Social History Narrative    Twin Girls      Family History   Problem Relation Age of Onset    Cancer Mother     Lung cancer Mother     Brain cancer Mother     Clotting disorder Father     Heart disease Father     Heart disease Brother     Heart disease Brother     Alcohol abuse Brother     Alcohol abuse Brother        Review of patient's allergies indicates:  No Known Allergies    Current Outpatient Prescriptions   Medication Sig    atorvastatin (LIPITOR) 40 MG tablet Take 40 mg by mouth once daily.    clonazePAM (KLONOPIN) 1 MG tablet Take 1 mg by mouth 2 (two) times daily as needed for Anxiety.    clotrimazole-betamethasone (LOTRISONE) lotion Apply topically as needed.    diclofenac sodium 1 % Gel Apply 4 g topically 4 (four) times daily as needed.    escitalopram oxalate (LEXAPRO) 10 MG tablet Take 10 mg by mouth once daily.    gabapentin (NEURONTIN) 300 MG capsule Take 300 mg by mouth 3 (three) times daily.    hydrocodone-acetaminophen 10-325mg (NORCO)  mg Tab Take 1 tablet by mouth every 6 to 8 hours as needed for Pain.    meloxicam (MOBIC) 15 MG tablet Take 15 mg by mouth once daily.     No current  facility-administered medications for this visit.        REVIEW OF SYSTEMS:    GENERAL:  No weight loss, malaise or fevers.  HEENT: + headaches  NECK:  + neck pain  RESPIRATORY:  Negative for wheezing or shortness of breath.  CARDIOVASCULAR:  Negative for chest pain or palpitations.  GI:  Negative for abdominal discomfort, blood in stools or black stools or change in bowel habits.  MUSCULOSKELETAL:  See HPI.  SKIN:  Negative for lesions, rash, and itching.  PSYCH:  + sleep disturbance  HEMATOLOGY/LYMPHOLOGY:  Negative for prolonged bleeding, bruising easily or swollen nodes.  NEURO:   No history of paralysis, seizures or tremors.  All other reviewed and negative other than HPI.    OBJECTIVE:    /76 (BP Location: Left arm, Patient Position: Sitting)   Pulse (!) 57   Ht 6' (1.829 m)   Wt 81.8 kg (180 lb 6.4 oz)   BMI 24.47 kg/m²     PHYSICAL EXAMINATION:    GENERAL: Well appearing, in no acute distress.   PSYCH:  Mood and affect is appropriate.  Awake, alert, and oriented x 3.  SKIN: Skin color, texture, turgor normal, no rashes or lesions  HEENT: Normocephalic, atraumatic.  EOM intact.  CV: Radial pulses are 2+.  RESP:  Respirations are unlabored.  GI: Abdomen soft and non-tender.  MSK:  No atrophy or tone abnormalities are noted.                 Neck: Tenderness to palpation over the cervical paraspinous muscles. Pain with neck flexion, extension, and lateral rotation.  No obvious deformity or signs of trauma.  ROM is limited.     Back: Tenderness to palpation over the lumbar spine and paraspinous muscles.  + pain with facet loading and back extension/rotation. Decreased range of motion on flexion and extension.     Extremities:  Peripheral joint ROM is full and pain free without obvious instability or laxity in all four extremities. No edema or skin discolorations noted.      Gait:  Gait is antalgic, uses cane.     NEUR:  Strength testing is fair and symmetric throughout all muscle groups in the upper and  lower extremities. No loss of sensation is noted.     ASSESSMENT: 67 y.o.  male with chronic low back pain which radiates down the bilateral lower etxremities, consistent with discogenic pain and radiculitis. Also with chronic neck pain that radiates down the bilateral upper extremities. Cervical MRI reveals no significant central or foraminal stenosis.    1. Osteoarthritis of spine with radiculopathy, cervical region    2. DDD (degenerative disc disease), cervical    3. Lumbar radiculopathy    4. DDD (degenerative disc disease), lumbar    5. Lumbar foraminal stenosis        PLAN:     - I have stressed the importance of physical activity and a home exercise plan to help with pain and improve health.  - Schedule for Cervical interlaminar epidural steroid injection at C7-T1 to help with cervical radicular pain.  - Will consider caudal KAILASH in the future for patient's low back and lumbar radicular pain.  - Will also consider SCS trial.  - Counseled patient regarding the importance of smoking cessation.  - RTC after procedure.    The above plan and management options were discussed at length with patient. Patient is in agreement with the above and verbalized understanding. It will be communicated with the referring physician via electronic record, fax, or mail.    Kamar Haile III  05/10/2018

## 2018-06-01 NOTE — INTERVAL H&P NOTE
The patient has been examined and the H&P has been reviewed:    I concur with the findings and no changes have occurred since H&P was written.    Anesthesia/Surgery risks, benefits and alternative options discussed and understood by patient/family.          Active Hospital Problems    Diagnosis  POA    Cervical radiculitis [M54.12]  Yes    DDD (degenerative disc disease), cervical [M50.30]  Yes      Resolved Hospital Problems    Diagnosis Date Resolved POA   No resolved problems to display.

## 2018-06-01 NOTE — DISCHARGE INSTRUCTIONS

## 2018-06-01 NOTE — NURSING
Discharge instructions and medlist given.  Patient and family verbalized understanding.  Off unit via wheelchair with xwife pushing.

## 2018-06-01 NOTE — OP NOTE
"Patient Name: Hany Esposito  MRN: [id number]    INFORMED CONSENT: The procedure, risks, benefits and options were discussed with patient. There are no contraindications to the procedure. The patient expressed understanding and agreed to proceed. The personnel performing the procedure was discussed. I verify that I personally obtained the patient's consent prior to the start of the procedure and the signed consent can be found on the patient's chart.    PROCEDURE: C7-T1 INTERLAMINAR EPIDURAL STEROID INJECTION     Procedure Date: 6/1/2018  Surgeon(s) and Role:     * Kamar Haile III, MD - Primary  Anesthesia: RN IV Sedation  Procedure(s) (LRB):  INJECTION-STEROID-EPIDURAL-CERVICAL C7-T1 (N/A)    Pre Procedure diagnosis:   1) Cervical Radiculitis  2) Cervical DDD    Post-Procedure diagnosis: SAME    Sedation: Yes - Midazolam 2 mg and Fentanyl 100 mcg IV    DESCRIPTION OF PROCEDURE: The patient was brought to the procedure room. IV access was obtained prior to the procedure. The patient was positioned prone on the fluoroscopy table. Continuous hemodynamic monitoring was initiated including blood pressure, EKG, and pulse oximetry. The area of the cervical spine was prepped with chlorhexidine three times and draped in a sterile fashion. Skin anesthesia was achieved using 5 mL of Lidocaine 1% over the respective injection site. The C7-T1 interspace was visualized under fluoroscopic imaging. A 20 gauge 3 1/2" Tuohy needle was slowly inserted and advanced using loss of resistance to saline with AP, oblique and lateral fluoroscopic imaging for needle guidance. Negative aspiration for blood or CSF was confirmed. Epidural contrast spread was confirmed using 2mL of Omnipaque 300 contrast. Spread of the contrast in the cervical epidural space was noted. 3 mL of Lidocaine 0.5% and 80 mg Depo-Medrol was injected. The needle was flushed and removed. Bleeding was nil. A sterile dressing was applied. No specimens collected. " The patient was taken back to the recovery room for further observation.

## 2018-06-21 ENCOUNTER — OFFICE VISIT (OUTPATIENT)
Dept: PAIN MEDICINE | Facility: CLINIC | Age: 68
End: 2018-06-21
Attending: ANESTHESIOLOGY
Payer: MEDICARE

## 2018-06-21 VITALS
HEART RATE: 62 BPM | SYSTOLIC BLOOD PRESSURE: 110 MMHG | BODY MASS INDEX: 24.36 KG/M2 | HEIGHT: 72 IN | WEIGHT: 179.88 LBS | DIASTOLIC BLOOD PRESSURE: 69 MMHG

## 2018-06-21 DIAGNOSIS — M54.12 CERVICAL RADICULOPATHY: ICD-10-CM

## 2018-06-21 DIAGNOSIS — M54.16 LUMBAR RADICULOPATHY: ICD-10-CM

## 2018-06-21 DIAGNOSIS — M47.26 OSTEOARTHRITIS OF SPINE WITH RADICULOPATHY, LUMBAR REGION: ICD-10-CM

## 2018-06-21 DIAGNOSIS — M51.36 DDD (DEGENERATIVE DISC DISEASE), LUMBAR: Primary | ICD-10-CM

## 2018-06-21 PROCEDURE — 99999 PR PBB SHADOW E&M-EST. PATIENT-LVL III: CPT | Mod: PBBFAC,,, | Performed by: ANESTHESIOLOGY

## 2018-06-21 PROCEDURE — 99214 OFFICE O/P EST MOD 30 MIN: CPT | Mod: S$GLB,,, | Performed by: ANESTHESIOLOGY

## 2018-06-21 NOTE — PROGRESS NOTES
Chronic Pain - Established    INTERVAL HISTORY (06/21/2018):    Hany Esposito presents to the clinic today for a follow-up appointment for neck pain. Since the last visit, the neck pain has been improving. The patient reports 100% pain relief of his neck, shoulder, and arm pain after cervical KAILASH which continues. He notes significant improvement in his functional status and sleep since the procedure. Current pain intensity in the neck is 0/10. His chief complaint today is low back pain. The back pain is located in the bilateral low back area and radiates into the buttocks and down the back of the legs into the feet. He describes the pain as sharp and stabbing. Current pain intensity is 7/10. The back pain is made worse with prolonged sitting/standing and walking. The pain is mitigated by pain medication, heat, and TENS unit. He obtained no improvement from Right L4/5 and L5/S1 TESI in April 2018.        INTERVAL HISTORY (05/10/2018):    Hany Esposito presents to the clinic today for a follow-up appointment for low back and neck pain. Since the last visit, the low back pain has been persistent. The patient reports no improvement after Right Transforaminal epidural steroid injection at L4 and L5. Current pain intensity in the low back is 6/10. He continues to experience pain in the low back that radiates down the back of both legs. The pain has been present since a fall in a casino bathroom.    He also continues to experience significant pain in the neck that radiates down both arms in no particular dermatomal distribution. Current pain intensity in the neck is 3-4/10. The pain is worse at nighttime. He continues to use a TENS unit on his neck and low back which helps.     SUBJECTIVE:    Hany Esposito is a 68 y/o male with hx of lumbar surgery (1978 and 1982) who presents to the clinic for the evaluation of neck and low back pain. The current pain started in April 2015 following a fall in a [place] bathroom and  symptoms have been worsening. The neck pain radiates into the shoulder and down both arms to the level of the forearm in no particular dermatomal distribution.  The neck pain is described as sharp, stabbing and throbbing and is rated as 6/10. The pain is rated with a score of  3/10 on the AVERAGE day and a score of 8/10 on the WORST day. The neck pain is exacerbated by head turning, neck extension, and laying in bed. He also complains of bilateral low back pain which radiates into the hips and down the back of the legs into the feet. The back pain is described as sharp, stabbing and throbbing and is rated as 7/10. The pain is rated with a score of  4/10 on the AVERAGE day and a score of 10/10 on the WORST day. The back pain is exacerbated by sitting, standing, bending, walking, getting out of bed/chair, twisting and cold weather. The back pain is more significant than the neck pain. Symptoms interfere with daily activity and sleeping.  The pain is mitigated somewhat by heat, pain medication, topical pain cream and TENS unit. He has never tried chiropractic care. He tried aquatic therapy which helped after the injury. He reports spending 18 hours per day reclining. The patient reports 3 hours of uninterrupted sleep per night. The patient tells me this is an open  case. He has been evaluated by neurosurgery last year and it was not felt surgery was necessary.    Patient denies bowel/bladder incontinence. He reports subjective weakness in the legs and numbness in both feet.       Pain Disability Index Review:  Last 3 PDI Scores 6/21/2018 5/10/2018 3/29/2018   Pain Disability Index (PDI) 48 45 52       Pain Medications:    - Norco 10/325 QID  - Gabapentin 300 mg TID  - Mobic 15 mg daily  - Clonazepam 1 mg BID     report:  Reviewed     Pain Procedures: None     Imaging:     Cervical MRI (4/12/2018):    FINDINGS:  Alignment: There is an accentuated cervical lordosis.    Vertebrae: Normal marrow signal. No  fracture.    Discs: There is diffuse disc desiccation with narrowing    Cord: Normal.    Skull base and craniocervical junction: Normal.    Degenerative findings:    C2-C3: There is disc desiccation.  There is no evidence of herniation, spinal canal stenosis.  The neural foramen or normal.    C3-C4: There is disc desiccation.  There is a small central uncovertebral bony spur which encroaches upon the anterior thecal sac.  There is normal spinal canal stenosis.  Normal foramina.    C4-C5: The disc signal is abnormal with desiccation.  There is a circumferential bulging disc which encroaches upon the anterior thecal sac.  There is no spinal cord compression.  There is mild facet hypertrophy.  Slight narrowing is noted of the left neural foramen.    C5-C6: There is evidence of disc desiccation.  There is a circumferential bulging disc which encroaches upon the anterior thecal sac. There is no spinal cord compression. There is no spinal canal stenosis.  The foramen are normal.  Mild facet hypertrophy is noted.    C6-C7: There is disc desiccation.  A small uncovertebral bony spur encroaches upon the anterior thecal sac.  There is no spinal cord compression.  No spinal canal stenosis.  The foramina are normal.    C7-T1: The disc signal is normal.  There is now evidence of herniation or spinal canal stenosis.  Normal foramina.    Paraspinal muscles & soft tissues: Unremarkable.    Lumbar MRI (10/17/2017):    1) Stable moderate compression deformity involving the superior endplate of L1 with no new or subacute compression deformities.  2) Mild to moderate facet arthropathy L3-4 without significant foraminal restriction.  3) Moderate lumbar stenosis L4-5 predominately from hypertrophic facet arthopathy. There is moderate bilateral foraminal restriction with mild contact of the exiting nerve root on th left foramen. There is also mild contact of the traversing nerve root on the left. The findings appear more eccentric when  compared to the prior study of 5/29/2015.  4) Prominent discogenic disease L5-S1 with prominent facet arthropathy. There is moderate foraminal restriction bilaterally without root contact. These findings appear generally stable when compared to the prior study.     Cervical MRI (5/29/2015):     1) Exaggerated cervical lordosis is noted which is within normal limits without acute cervical vertebral body fracture, compression fracture deformity or spondylolisthesis. Low-grade discogenic disease is present mainly at C4-5, C5-6 and C6-7 with low-grade uncovertebral joint hypertrophy at C5-6, and C6-7. No central canal stenosis is present.  2) Mild bilateral neural foraminal narrowing is present at C5-6 and C6-7 with minimal bilateral neural foraminal narrowing at C3-4.    Past Medical History:   Diagnosis Date    Back pain     Coronary artery disease     DDD (degenerative disc disease), cervical 2018    DDD (degenerative disc disease), lumbar 2018    Depression     Hyperlipidemia     Myocardial infarction 2016    2 heart stents     Past Surgical History:   Procedure Laterality Date    CHOLECYSTECTOMY      CORONARY STENT PLACEMENT      Nov 11    HERNIA REPAIR      SPINE SURGERY  1978 and 1982    x2 no hardware     Social History     Social History    Marital status:      Spouse name: N/A    Number of children: 2    Years of education: N/A     Occupational History    Not on file.     Social History Main Topics    Smoking status: Current Every Day Smoker     Packs/day: 0.25     Types: Cigarettes    Smokeless tobacco: Never Used    Alcohol use No    Drug use: No    Sexual activity: Not on file     Other Topics Concern    Not on file     Social History Narrative    Twin Girls      Family History   Problem Relation Age of Onset    Cancer Mother     Lung cancer Mother     Brain cancer Mother     Clotting disorder Father     Heart disease Father     Heart disease Brother     Heart disease  Brother     Alcohol abuse Brother     Alcohol abuse Brother        Review of patient's allergies indicates:  No Known Allergies    Current Outpatient Prescriptions   Medication Sig    atorvastatin (LIPITOR) 40 MG tablet Take 40 mg by mouth once daily.    clonazePAM (KLONOPIN) 1 MG tablet Take 1 mg by mouth 2 (two) times daily as needed for Anxiety.    clotrimazole-betamethasone (LOTRISONE) lotion Apply topically as needed.    diclofenac sodium 1 % Gel Apply 4 g topically 4 (four) times daily as needed.    escitalopram oxalate (LEXAPRO) 10 MG tablet Take 10 mg by mouth once daily.    gabapentin (NEURONTIN) 300 MG capsule Take 300 mg by mouth 3 (three) times daily.    hydrocodone-acetaminophen 10-325mg (NORCO)  mg Tab Take 1 tablet by mouth every 6 to 8 hours as needed for Pain.    meloxicam (MOBIC) 15 MG tablet Take 15 mg by mouth once daily.     No current facility-administered medications for this visit.        REVIEW OF SYSTEMS:    GENERAL:  No weight loss, malaise or fevers.  HEENT: Negative for headaches.  NECK:  Negative for neck pain.  RESPIRATORY:  Negative for wheezing or shortness of breath.  CARDIOVASCULAR:  Negative for chest pain or palpitations.  GI:  Negative for abdominal discomfort, blood in stools or black stools or change in bowel habits.  MUSCULOSKELETAL:  See HPI.  SKIN:  Negative for lesions, rash, and itching.  PSYCH:  + sleep disturbance (improved)  HEMATOLOGY/LYMPHOLOGY:  Negative for prolonged bleeding, bruising easily or swollen nodes.  NEURO:   No history of paralysis, seizures or tremors.  All other reviewed and negative other than HPI.    OBJECTIVE:    /69 (BP Location: Left arm, Patient Position: Sitting)   Pulse 62   Ht 6' (1.829 m)   Wt 81.6 kg (179 lb 14.4 oz)   BMI 24.40 kg/m²     PHYSICAL EXAMINATION:    GENERAL: Well appearing, in no acute distress.   PSYCH:  Mood and affect is appropriate.  Awake, alert, and oriented x 3.  SKIN: Skin color, texture,  turgor normal, no rashes or lesions  HEENT: Normocephalic, atraumatic.  EOM intact.  CV: Radial pulses are 2+.  RESP:  Respirations are unlabored.  GI: Abdomen soft and non-tender.  MSK:  No atrophy or tone abnormalities are noted.                 Neck: No tenderness to palpation over the cervical paraspinous muscles. No pain with neck flexion, extension, and lateral rotation.  No obvious deformity or signs of trauma.  ROM is improved greatly.     Back: Tenderness to palpation over the lumbar spine and paraspinous muscles.  + pain with facet loading and back extension/rotation. Decreased range of motion on flexion and extension.     Extremities:  Peripheral joint ROM is full and pain free without obvious instability or laxity in all four extremities. No edema or skin discolorations noted.      Gait:  Gait is antalgic, uses cane.     NEUR:  Strength testing is fair and symmetric throughout all muscle groups in the upper and lower extremities. No loss of sensation is noted.     ASSESSMENT: 67 y.o.  male with chronic neck and arm pain which is improving after cervical KAILASH. He also has chronic low back pain which radiates down the bilateral lower etxremities, consistent with discogenic pain and radiculitis. We discussed trying KAILASH from different approach for his low back and leg pain. The patient is interested in procedure and would like to proceed.    1. DDD (degenerative disc disease), lumbar    2. Lumbar radiculopathy    3. Osteoarthritis of spine with radiculopathy, lumbar region    4. Cervical radiculopathy        PLAN:     - I have stressed the importance of physical activity and a home exercise plan to help with pain and improve health.  - Schedule for caudal KAILASH to help with patient's low back and leg pain.  - Will consider SCS trial in the future if no improvement from above.  - Counseled patient regarding the importance of smoking cessation.  - RTC after procedure.    The above plan and management options were  discussed at length with patient. Patient is in agreement with the above and verbalized understanding. It will be communicated with the referring physician via electronic record, fax, or mail.    Kamar Haile III  06/21/2018

## 2018-07-23 ENCOUNTER — TELEPHONE (OUTPATIENT)
Dept: PAIN MEDICINE | Facility: CLINIC | Age: 68
End: 2018-07-23

## 2018-07-23 NOTE — TELEPHONE ENCOUNTER
----- Message from Meghan Rg sent at 7/20/2018  3:14 PM CDT -----  Type: Patient Requesting to cancel procedure    Who Called:  Patient  Best Call Back Number: [phone]  Additional Information: Patient requesting to cancel procedure on July 26th/stated he will call back when ready to reschedule.

## 2019-01-28 ENCOUNTER — TELEPHONE (OUTPATIENT)
Dept: PAIN MEDICINE | Facility: CLINIC | Age: 69
End: 2019-01-28

## 2019-01-28 ENCOUNTER — OFFICE VISIT (OUTPATIENT)
Dept: SPINE | Facility: CLINIC | Age: 69
End: 2019-01-28
Payer: MEDICARE

## 2019-01-28 VITALS
SYSTOLIC BLOOD PRESSURE: 110 MMHG | WEIGHT: 173.94 LBS | BODY MASS INDEX: 23.56 KG/M2 | HEIGHT: 72 IN | HEART RATE: 59 BPM | DIASTOLIC BLOOD PRESSURE: 70 MMHG

## 2019-01-28 DIAGNOSIS — M54.16 LUMBAR RADICULOPATHY: Primary | ICD-10-CM

## 2019-01-28 DIAGNOSIS — G89.29 CHRONIC BILATERAL LOW BACK PAIN WITH BILATERAL SCIATICA: Primary | ICD-10-CM

## 2019-01-28 DIAGNOSIS — M54.41 CHRONIC BILATERAL LOW BACK PAIN WITH BILATERAL SCIATICA: Primary | ICD-10-CM

## 2019-01-28 DIAGNOSIS — M54.42 CHRONIC BILATERAL LOW BACK PAIN WITH BILATERAL SCIATICA: Primary | ICD-10-CM

## 2019-01-28 PROCEDURE — 99204 PR OFFICE/OUTPT VISIT, NEW, LEVL IV, 45-59 MIN: ICD-10-PCS | Mod: ,,, | Performed by: PHYSICAL MEDICINE & REHABILITATION

## 2019-01-28 PROCEDURE — 1101F PR PT FALLS ASSESS DOC 0-1 FALLS W/OUT INJ PAST YR: ICD-10-PCS | Mod: ,,, | Performed by: PHYSICAL MEDICINE & REHABILITATION

## 2019-01-28 PROCEDURE — 99204 OFFICE O/P NEW MOD 45 MIN: CPT | Mod: ,,, | Performed by: PHYSICAL MEDICINE & REHABILITATION

## 2019-01-28 PROCEDURE — 1101F PT FALLS ASSESS-DOCD LE1/YR: CPT | Mod: ,,, | Performed by: PHYSICAL MEDICINE & REHABILITATION

## 2019-01-28 RX ORDER — MIRTAZAPINE 7.5 MG/1
TABLET, FILM COATED ORAL
Refills: 1 | COMMUNITY
Start: 2019-01-16 | End: 2019-05-28

## 2019-01-28 NOTE — H&P (VIEW-ONLY)
SUBJECTIVE:    Patient ID: Hany Esposito is a 68 y.o. male.    Chief Complaint: Consult (patient c/o lower back, hip, leg pain for about 4 years. Pt stated that monyis is his first visit in back and spine.)    This is a 68-year-old man who sees Dr. Whitehead for his primary care.  He has history of hyperlipidemia and history of coronary artery disease status post stent about 3 years ago.  Not on anticoagulants.  He has a long history of neck and low back pain.  He had surgery on his back in 1978 and 1982.  Apparently he fell in 2015 and since then he has had persistent low back pain at the lumbosacral junction associated with bilateral leg pain.  He presented to Dr. Alise Ceja, neurosurgeon in April of 2017 with complaints of neck and back pain.  She reviewed his records and did not feel that he was a surgical candidate.  He was subsequently referred to  for interventional pain management.  The patient has complained at the time of were both neck and arm pain as well as low back and leg pain.  Patient underwent transforaminal epidural steroid injection on the right at L4-5 in04/18 with no relief in his pain.  He underwent interlaminar epidural steroid injection at C7-T1 in 06/18 with a 100% improvement in neck pain.  Patient no longer has discomfort in his neck.  With regards to his low back and leg pain it was recommended that he undergo caudal epidural steroid injection and he was scheduled for such but canceled it.  Patient says that the doctor was not that optimistic that the caudal injection would help there foot patient canceled the procedure.  I also note from the record that spinal cord stimulator has been considered.  Patient also uses Voltaren gel he is on gabapentin 300 mg 3 times a day and is prescribed Norco 10 1.  One hundred twenty per month by Dr.Smita Vang his pain management provider.  He presents to me today with complaints of centralized low back pain at the lumbosacral junction  radiating discomfort down both legs to the great toes on both sides.  He denies any rekha weakness bowel or bladder dysfunction fever chills sweats or unexpected weight loss.  Review of the lumbar MRI from 2015 shows moderate to severe degeneration at L5-S1          Past Medical History:   Diagnosis Date    Back pain     Coronary artery disease     DDD (degenerative disc disease), cervical 2018    DDD (degenerative disc disease), lumbar 2018    DDD (degenerative disc disease), lumbar 2018    Depression     Hyperlipidemia     Myocardial infarction 2016    2 heart stents     Social History     Socioeconomic History    Marital status:      Spouse name: Not on file    Number of children: 2    Years of education: Not on file    Highest education level: Not on file   Social Needs    Financial resource strain: Not on file    Food insecurity - worry: Not on file    Food insecurity - inability: Not on file    Transportation needs - medical: Not on file    Transportation needs - non-medical: Not on file   Occupational History    Not on file   Tobacco Use    Smoking status: Current Every Day Smoker     Packs/day: 0.25     Types: Cigarettes    Smokeless tobacco: Never Used   Substance and Sexual Activity    Alcohol use: No    Drug use: No    Sexual activity: Not on file   Other Topics Concern    Not on file   Social History Narrative    [employer]      Past Surgical History:   Procedure Laterality Date    CHOLECYSTECTOMY      CORONARY STENT PLACEMENT      Nov 11    HERNIA REPAIR      INJECTION-STEROID-EPIDURAL-TRANSFORAMINAL L4 & L5 Right 4/19/2018    Performed by Kamar [facility]ile III, [facility] at [facility] C[facility]    SPINE SURGERY  1978 and 1982    x2 no hardware     Family History   Problem Relation Age of Onset    Cancer Mother     Lung cancer Mother     Brain cancer Mother     Clotting disorder Father     Heart disease Father     Heart disease Brother     Heart disease Brother      Alcohol abuse Brother     Alcohol abuse Brother      Vitals:    01/28/19 0911   BP: 110/70   Pulse: (!) 59   Weight: 78.9 kg (173 lb 15.1 oz)   Height: 6' (1.829 m)       Review of Systems   Constitutional: Negative for chills, diaphoresis, fatigue, fever and unexpected weight change.   HENT: Negative for trouble swallowing.    Eyes: Negative for visual disturbance.   Respiratory: Negative for shortness of breath.    Cardiovascular: Negative for chest pain.   Gastrointestinal: Negative for abdominal pain, constipation, diarrhea, nausea and vomiting.   Genitourinary: Negative for difficulty urinating.   Musculoskeletal: Negative for arthralgias, back pain, gait problem, joint swelling, myalgias, neck pain and neck stiffness.   Neurological: Negative for dizziness, speech difficulty, weakness, light-headedness, numbness and headaches.          Objective:      Physical Exam   Constitutional: He is oriented to person, place, and time. He appears well-developed and well-nourished.   Neurological: He is alert and oriented to person, place, and time.   He is awake and in no acute distress  He has moderate tenderness to palpation in the lumbar paraspinous musculature with no palpable masses  He has limited forward flexion and extension secondary to pain  Deep tendon reflexes are +1 at the knees and trace at the ankles  Strength is within functional limits bilaterally but I question is effort secondary to pain  Straight leg raising bilaterally causes discomfort in both legs             Assessment:       1. Chronic bilateral low back pain with bilateral sciatica           Plan:     she has a nonfocal examination from a neurological standpoint and no historical red flags.  He is a chronic pain patient.  He has not gotten any significant relief from transforaminal epidural steroid injections so I would recommend interlaminar epidural injections at L5-S1.  I would like to ask Dr. Varghese to review his x-rays to make sure that is  technically feasible.  If he fails that then he needs an updated MRI of the lumbar spine in may need EMG and nerve conduction studies prior to any neurosurgical referral.  I did explain to him that since he has a chronic pain patient that it may be difficult to get his pain under control.  I will see him back after the KAILASH      Chronic bilateral low back pain with bilateral sciatica

## 2019-01-28 NOTE — PROGRESS NOTES
SUBJECTIVE:    Patient ID: Hany Esposito is a 68 y.o. male.    Chief Complaint: Consult (patient c/o lower back, hip, leg pain for about 4 years. Pt stated that monyis is his first visit in back and spine.)    This is a 68-year-old man who sees Dr. Whitehead for his primary care.  He has history of hyperlipidemia and history of coronary artery disease status post stent about 3 years ago.  Not on anticoagulants.  He has a long history of neck and low back pain.  He had surgery on his back in 1978 and 1982.  Apparently he fell in 2015 and since then he has had persistent low back pain at the lumbosacral junction associated with bilateral leg pain.  He presented to Dr. Alise Ceja, neurosurgeon in April of 2017 with complaints of neck and back pain.  She reviewed his records and did not feel that he was a surgical candidate.  He was subsequently referred to  for interventional pain management.  The patient has complained at the time of were both neck and arm pain as well as low back and leg pain.  Patient underwent transforaminal epidural steroid injection on the right at L4-5 in04/18 with no relief in his pain.  He underwent interlaminar epidural steroid injection at C7-T1 in 06/18 with a 100% improvement in neck pain.  Patient no longer has discomfort in his neck.  With regards to his low back and leg pain it was recommended that he undergo caudal epidural steroid injection and he was scheduled for such but canceled it.  Patient says that the doctor was not that optimistic that the caudal injection would help there foot patient canceled the procedure.  I also note from the record that spinal cord stimulator has been considered.  Patient also uses Voltaren gel he is on gabapentin 300 mg 3 times a day and is prescribed Norco 10 1.  One hundred twenty per month by Dr.Smita Vang his pain management provider.  He presents to me today with complaints of centralized low back pain at the lumbosacral junction  radiating discomfort down both legs to the great toes on both sides.  He denies any rekha weakness bowel or bladder dysfunction fever chills sweats or unexpected weight loss.  Review of the lumbar MRI from 2015 shows moderate to severe degeneration at L5-S1          Past Medical History:   Diagnosis Date    Back pain     Coronary artery disease     DDD (degenerative disc disease), cervical 2018    DDD (degenerative disc disease), lumbar 2018    DDD (degenerative disc disease), lumbar 2018    Depression     Hyperlipidemia     Myocardial infarction 2016    2 heart stents     Social History     Socioeconomic History    Marital status:      Spouse name: Not on file    Number of children: 2    Years of education: Not on file    Highest education level: Not on file   Social Needs    Financial resource strain: Not on file    Food insecurity - worry: Not on file    Food insecurity - inability: Not on file    Transportation needs - medical: Not on file    Transportation needs - non-medical: Not on file   Occupational History    Not on file   Tobacco Use    Smoking status: Current Every Day Smoker     Packs/day: 0.25     Types: Cigarettes    Smokeless tobacco: Never Used   Substance and Sexual Activity    Alcohol use: No    Drug use: No    Sexual activity: Not on file   Other Topics Concern    Not on file   Social History Narrative    [employer]      Past Surgical History:   Procedure Laterality Date    CHOLECYSTECTOMY      CORONARY STENT PLACEMENT      Nov 11    HERNIA REPAIR      INJECTION-STEROID-EPIDURAL-TRANSFORAMINAL L4 & L5 Right 4/19/2018    Performed by Kamar Haile III, MD at [facility] CATH LAB    SPINE SURGERY  1978 and 1982    x2 no hardware     Family History   Problem Relation Age of Onset    Cancer Mother     Lung cancer Mother     Brain cancer Mother     Clotting disorder Father     Heart disease Father     Heart disease Brother     Heart disease Brother      Alcohol abuse Brother     Alcohol abuse Brother      Vitals:    01/28/19 0911   BP: 110/70   Pulse: (!) 59   Weight: 78.9 kg (173 lb 15.1 oz)   Height: 6' (1.829 m)       Review of Systems   Constitutional: Negative for chills, diaphoresis, fatigue, fever and unexpected weight change.   HENT: Negative for trouble swallowing.    Eyes: Negative for visual disturbance.   Respiratory: Negative for shortness of breath.    Cardiovascular: Negative for chest pain.   Gastrointestinal: Negative for abdominal pain, constipation, diarrhea, nausea and vomiting.   Genitourinary: Negative for difficulty urinating.   Musculoskeletal: Negative for arthralgias, back pain, gait problem, joint swelling, myalgias, neck pain and neck stiffness.   Neurological: Negative for dizziness, speech difficulty, weakness, light-headedness, numbness and headaches.          Objective:      Physical Exam   Constitutional: He is oriented to person, place, and time. He appears well-developed and well-nourished.   Neurological: He is alert and oriented to person, place, and time.   He is awake and in no acute distress  He has moderate tenderness to palpation in the lumbar paraspinous musculature with no palpable masses  He has limited forward flexion and extension secondary to pain  Deep tendon reflexes are +1 at the knees and trace at the ankles  Strength is within functional limits bilaterally but I question is effort secondary to pain  Straight leg raising bilaterally causes discomfort in both legs             Assessment:       1. Chronic bilateral low back pain with bilateral sciatica           Plan:     she has a nonfocal examination from a neurological standpoint and no historical red flags.  He is a chronic pain patient.  He has not gotten any significant relief from transforaminal epidural steroid injections so I would recommend interlaminar epidural injections at L5-S1.  I would like to ask Dr. Varghese to review his x-rays to make sure that is  technically feasible.  If he fails that then he needs an updated MRI of the lumbar spine in may need EMG and nerve conduction studies prior to any neurosurgical referral.  I did explain to him that since he has a chronic pain patient that it may be difficult to get his pain under control.  I will see him back after the KAILASH      Chronic bilateral low back pain with bilateral sciatica

## 2019-01-28 NOTE — TELEPHONE ENCOUNTER
----- Message from Napoleon Tao MD sent at 1/28/2019 12:28 PM CST -----  Please schedule for interlaminar epidural steroid injection at L5-S1

## 2019-01-28 NOTE — TELEPHONE ENCOUNTER
----- Message from Andrew Bates sent at 1/28/2019  1:53 PM CST -----  Contact: same  Patient called in and stated when he saw Dr. Napoleon Tao this morning he informed patient that he would prefer him to see Dr. Varghese (pt is current patient of Dr. Haile in [place]/[facility]).  Supervisor suggested a message be sent to office to see if Dr. Varghese was OK with this first before scheduling.    Patient call back number is [phone]

## 2019-02-11 ENCOUNTER — HOSPITAL ENCOUNTER (OUTPATIENT)
Facility: AMBULARY SURGERY CENTER | Age: 69
Discharge: HOME | End: 2019-02-11
Attending: ANESTHESIOLOGY | Admitting: ANESTHESIOLOGY
Payer: MEDICARE

## 2019-02-11 DIAGNOSIS — M51.36 DDD (DEGENERATIVE DISC DISEASE), LUMBAR: Primary | ICD-10-CM

## 2019-02-11 DIAGNOSIS — M54.16 LUMBAR RADICULITIS: ICD-10-CM

## 2019-02-11 PROCEDURE — 62323 NJX INTERLAMINAR LMBR/SAC: CPT | Mod: ,,, | Performed by: ANESTHESIOLOGY

## 2019-02-11 PROCEDURE — 62323 PR INJ LUMBAR/SACRAL, W/IMAGING GUIDANCE: ICD-10-PCS | Mod: ,,, | Performed by: ANESTHESIOLOGY

## 2019-02-11 PROCEDURE — 62323 NJX INTERLAMINAR LMBR/SAC: CPT | Performed by: ANESTHESIOLOGY

## 2019-02-11 PROCEDURE — 99152 MOD SED SAME PHYS/QHP 5/>YRS: CPT | Mod: ,,, | Performed by: ANESTHESIOLOGY

## 2019-02-11 PROCEDURE — 99152 PR MOD CONSCIOUS SEDATION, SAME PHYS, 5+ YRS, FIRST 15 MIN: ICD-10-PCS | Mod: ,,, | Performed by: ANESTHESIOLOGY

## 2019-02-11 RX ORDER — SODIUM CHLORIDE, SODIUM LACTATE, POTASSIUM CHLORIDE, CALCIUM CHLORIDE 600; 310; 30; 20 MG/100ML; MG/100ML; MG/100ML; MG/100ML
INJECTION, SOLUTION INTRAVENOUS ONCE AS NEEDED
Status: COMPLETED | OUTPATIENT
Start: 2019-02-11 | End: 2019-02-11

## 2019-02-11 RX ORDER — SODIUM CHLORIDE 9 MG/ML
INJECTION, SOLUTION INTRAMUSCULAR; INTRAVENOUS; SUBCUTANEOUS
Status: DISCONTINUED | OUTPATIENT
Start: 2019-02-11 | End: 2019-02-11 | Stop reason: HOSPADM

## 2019-02-11 RX ORDER — DEXAMETHASONE SODIUM PHOSPHATE 10 MG/ML
INJECTION INTRAMUSCULAR; INTRAVENOUS
Status: DISCONTINUED | OUTPATIENT
Start: 2019-02-11 | End: 2019-02-11 | Stop reason: HOSPADM

## 2019-02-11 RX ORDER — MIDAZOLAM HYDROCHLORIDE 1 MG/ML
INJECTION INTRAMUSCULAR; INTRAVENOUS
Status: DISCONTINUED
Start: 2019-02-11 | End: 2019-02-11 | Stop reason: HOSPADM

## 2019-02-11 RX ORDER — LIDOCAINE HYDROCHLORIDE 10 MG/ML
INJECTION, SOLUTION EPIDURAL; INFILTRATION; INTRACAUDAL; PERINEURAL
Status: DISCONTINUED | OUTPATIENT
Start: 2019-02-11 | End: 2019-02-11 | Stop reason: HOSPADM

## 2019-02-11 RX ORDER — FENTANYL CITRATE 50 UG/ML
INJECTION, SOLUTION INTRAMUSCULAR; INTRAVENOUS
Status: DISCONTINUED | OUTPATIENT
Start: 2019-02-11 | End: 2019-02-11 | Stop reason: HOSPADM

## 2019-02-11 RX ORDER — DEXAMETHASONE SODIUM PHOSPHATE 10 MG/ML
INJECTION INTRAMUSCULAR; INTRAVENOUS
Status: DISCONTINUED
Start: 2019-02-11 | End: 2019-02-11 | Stop reason: HOSPADM

## 2019-02-11 RX ORDER — MIDAZOLAM HYDROCHLORIDE 2 MG/2ML
INJECTION, SOLUTION INTRAMUSCULAR; INTRAVENOUS
Status: DISCONTINUED | OUTPATIENT
Start: 2019-02-11 | End: 2019-02-11 | Stop reason: HOSPADM

## 2019-02-11 RX ORDER — FENTANYL CITRATE 50 UG/ML
INJECTION, SOLUTION INTRAMUSCULAR; INTRAVENOUS
Status: DISCONTINUED
Start: 2019-02-11 | End: 2019-02-11 | Stop reason: HOSPADM

## 2019-02-11 RX ADMIN — SODIUM CHLORIDE, SODIUM LACTATE, POTASSIUM CHLORIDE, CALCIUM CHLORIDE: 600; 310; 30; 20 INJECTION, SOLUTION INTRAVENOUS at 01:02

## 2019-02-11 NOTE — DISCHARGE SUMMARY
[facility]  Discharge Note  Short Stay    Admit Date: 2/11/2019    Discharge Date and Time: 2/11/2019    Attending Physician: Jose Varghese MD     Discharge Provider: Jose Varghese    Diagnoses:  Active Hospital Problems    Diagnosis  POA    *Lumbar radiculitis [M54.16]  Yes      Resolved Hospital Problems   No resolved problems to display.       Hospital Course: Lumbar KAILASH  Discharged Condition: Good    Final Diagnoses:   Active Hospital Problems    Diagnosis  POA    *Lumbar radiculitis [M54.16]  Yes      Resolved Hospital Problems   No resolved problems to display.       Disposition: Home or Self Care    Follow up/Patient Instructions:    Medications:  Reconciled Home Medications:      Medication List      CONTINUE taking these medications    atorvastatin 40 MG tablet  Commonly known as:  LIPITOR  Take 40 mg by mouth once daily.     clonazePAM 1 MG tablet  Commonly known as:  KLONOPIN  Take 1 mg by mouth 2 (two) times daily as needed for Anxiety.     diclofenac sodium 1 % Gel  Commonly known as:  VOLTAREN  Apply 4 g topically 4 (four) times daily as needed.     escitalopram oxalate 10 MG tablet  Commonly known as:  LEXAPRO  Take 10 mg by mouth once daily.     gabapentin 300 MG capsule  Commonly known as:  NEURONTIN  Take 300 mg by mouth 3 (three) times daily.     HYDROcodone-acetaminophen  mg per tablet  Commonly known as:  NORCO  Take 1 tablet by mouth every 6 to 8 hours as needed for Pain.     meloxicam 15 MG tablet  Commonly known as:  MOBIC  Take 15 mg by mouth once daily.     mirtazapine 7.5 MG Tab  Commonly known as:  REMERON  TAKE ONE TABLET BY MOUTH AT NIGHT          Discharge Procedure Orders   Call MD for:  temperature >100.4     Call MD for:  persistent nausea and vomiting or diarrhea     Call MD for:  severe uncontrolled pain     Call MD for:  redness, tenderness, or signs of infection (pain, swelling, redness, odor or green/yellow discharge around incision site)     Call MD for:   difficulty breathing or increased cough     Call MD for:  severe persistent headache        Follow up with MD in 2-3 weeks    Discharge Procedure Orders (must include Diet, Follow-up, Activity):   Discharge Procedure Orders (must include Diet, Follow-up, Activity)   Call MD for:  temperature >100.4     Call MD for:  persistent nausea and vomiting or diarrhea     Call MD for:  severe uncontrolled pain     Call MD for:  redness, tenderness, or signs of infection (pain, swelling, redness, odor or green/yellow discharge around incision site)     Call MD for:  difficulty breathing or increased cough     Call MD for:  severe persistent headache

## 2019-02-11 NOTE — PLAN OF CARE
Pt dc by WC with daughter. Pt is awake and alert. Pt agrees that he has all his belongings from checklist including cane, hat and glasses.

## 2019-02-11 NOTE — DISCHARGE INSTRUCTIONS
Before leaving, please make sure you have all your personal belongings such as glasses, purses, wallets, keys, cell phones, jewelry, jackets etc   Recovery After Procedural Sedation (Adult)  You have been given medicine by vein to make you sleep during your surgery. This may have included both a pain medicine and sleeping medicine. Most of the effects have worn off. But you may still have some drowsiness for the next 6 to 8 hours.  Home care  Follow these guidelines when you get home:  · For the next 8 hours, you should be watched by a responsible adult. This person should make sure your condition is not getting worse.  · Don't drink any alcohol for the next 24 hours.  · Don't drive, operate dangerous machinery, or make important business or personal decisions during the next 24 hours.  Note: Your healthcare provider may tell you not to take any medicine by mouth for pain or sleep in the next 4 hours. These medicines may react with the medicines you were given in the hospital. This could cause a much stronger response than usual.  Follow-up care  Follow up with your healthcare provider if you are not alert and back to your usual level of activity within 12 hours.  When to seek medical advice  Call your healthcare provider right away if any of these occur:  · Drowsiness gets worse  · Weakness or dizziness gets worse  · Repeated vomiting  · You can't be awakened   Date Last Reviewed: 10/18/2016  © 1177-7366 [employer]. [address] [place] [address]. All rights reserved. This information is not intended as a substitute for professional medical care. Always follow your healthcare professional's instructions.      Pain injection instructions:     This procedure may take a couple weeks to relieve pain  You may get some pain relief from the local anesthetic initally.    No driving for 24 hrs.   Activity as tolerated- gradually increase activities.  Dont lift over 10 lbs for 24 hrs   No heat at  injection sites x 2 days. No heating pads, hot tubs, saunas, or swimming in any body of water or pool for 2 days.  Use ice pack for mild swelling and for comfort , apply for 20 minutes, remove for 20 minute intervals. No direct contact of ice itself  to skin.  May shower today. No tub baths for two days.      Resume Aspirin, Plavix, or Coumadin the day after the procedure unless otherwise instructed.   If diabetic,monitor your glucose carefully as steroids can increase your glucose level    Seek immediate medical help for:   Severe increase in your usual pain or appearance of new pain.  Prolonged (mor than 8 hours) or increasing weakness or numbness in the legs or arms. Numbing medicine was injected and can affect the messages to and from the brain and legs or arms.  .    Fever above 101 ,Drainage,redness,active bleeding, or increased swelling at the injection site.  Headache, shortness of breath, chest pain, or breathing problems.

## 2019-02-11 NOTE — OP NOTE
PROCEDURE DATE: 2/11/2019    Procedure:   Interlaminar epidural steroid injection at L5-S1 under fluoroscopic guidance.    Diagnosis: lUMBAR DISC DISPLACEMENT WITHOUT MYELOPATHY  pOSTOP DIAGNOSIS: sAME    Physician: Jose Varghese M.D.    Medications injected:10 mg dexamethasone with 4 ml of preservative free NaCl    Local anesthetic injected:    Lidocaine 1% 2 ml total    Sedation Medications: RN IV sedation    Estimated blood loss:  None    Complications:  None    Technique:  Time-out taken to identify patient and procedure prior to starting the procedure.  With the patient laying in a prone position, the area was prepped and draped in the usual sterile fashion using ChloraPrep and a fenestrated drape.  After determining the target level with an AP fluoroscopic view, local anesthetic was given using a 25-gauge 1.5 inch needle by raising a wheal and then infiltrating toward the interlaminar entry space.  A 3.5inch 20-gauge Touhy needle was introduced under AP fluoroscopic guidance to the interlaminar space of L5-S1. Once the trajectory was established, the needle was visualized in the lateral view and advanced using loss of resistance technique. Once in the desired position, 1ml contrast was injected to confirm placement and there was no vascular uptake nor intrathecal spread.  The medication was then injected slowly. The patient tolerated the procedure well.      The patient was monitored after the procedure.   They were given post-procedure and discharge instructions to follow at home.  The patient was discharged in a stable condition.

## 2019-02-14 VITALS
DIASTOLIC BLOOD PRESSURE: 93 MMHG | TEMPERATURE: 98 F | OXYGEN SATURATION: 94 % | WEIGHT: 173 LBS | HEART RATE: 61 BPM | SYSTOLIC BLOOD PRESSURE: 140 MMHG | BODY MASS INDEX: 23.43 KG/M2 | HEIGHT: 72 IN | RESPIRATION RATE: 20 BRPM

## 2019-02-25 ENCOUNTER — TELEPHONE (OUTPATIENT)
Dept: SPINE | Facility: CLINIC | Age: 69
End: 2019-02-25

## 2019-02-25 ENCOUNTER — OFFICE VISIT (OUTPATIENT)
Dept: SPINE | Facility: CLINIC | Age: 69
End: 2019-02-25
Payer: MEDICARE

## 2019-02-25 ENCOUNTER — DOCUMENTATION ONLY (OUTPATIENT)
Dept: SPINE | Facility: CLINIC | Age: 69
End: 2019-02-25

## 2019-02-25 VITALS
DIASTOLIC BLOOD PRESSURE: 92 MMHG | SYSTOLIC BLOOD PRESSURE: 137 MMHG | HEART RATE: 63 BPM | BODY MASS INDEX: 25.31 KG/M2 | HEIGHT: 71 IN | WEIGHT: 180.75 LBS

## 2019-02-25 DIAGNOSIS — M54.16 LUMBAR RADICULITIS: Primary | ICD-10-CM

## 2019-02-25 PROCEDURE — 99213 OFFICE O/P EST LOW 20 MIN: CPT | Mod: ,,, | Performed by: PHYSICAL MEDICINE & REHABILITATION

## 2019-02-25 PROCEDURE — 99213 PR OFFICE/OUTPT VISIT, EST, LEVL III, 20-29 MIN: ICD-10-PCS | Mod: ,,, | Performed by: PHYSICAL MEDICINE & REHABILITATION

## 2019-02-25 PROCEDURE — 1101F PR PT FALLS ASSESS DOC 0-1 FALLS W/OUT INJ PAST YR: ICD-10-PCS | Mod: ,,, | Performed by: PHYSICAL MEDICINE & REHABILITATION

## 2019-02-25 PROCEDURE — 1101F PT FALLS ASSESS-DOCD LE1/YR: CPT | Mod: ,,, | Performed by: PHYSICAL MEDICINE & REHABILITATION

## 2019-02-25 NOTE — TELEPHONE ENCOUNTER
Left voice message for pt to call our office to schedule with Dr. Tan. Pt has MRI ordered, but needs to be scheduled.

## 2019-02-25 NOTE — PROGRESS NOTES
Pt notified to contact dept when outside/external MRI appt scheduled so follow up appt can be scheduled and referral updated. Pt advised at end of appt to bring in exteranl test CD for review when comes to follow up appt. Pt verbalized understanding of instructions.

## 2019-02-25 NOTE — PROGRESS NOTES
SUBJECTIVE:    Patient ID: Hany Esposito is a 68 y.o. male.    Chief Complaint: Post-op Evaluation (Nerve block by Dr Varghese) and Back Pain (radates bi lateral to both feet)    He is here for follow-up status post interlaminar epidural steroid injection at L5-S1 on 02/11/2019 with Dr. Varghese.  He has had no relief from that intervention and is requesting a neurosurgical evaluation          Past Medical History:   Diagnosis Date    Back pain     Coronary artery disease     DDD (degenerative disc disease), cervical 2018    DDD (degenerative disc disease), lumbar 2018    DDD (degenerative disc disease), lumbar 2018    Depression     Hyperlipidemia     Myocardial infarction 2016    2 heart stents     Social History     Socioeconomic History    Marital status:      Spouse name: Not on file    Number of children: 2    Years of education: Not on file    Highest education level: Not on file   Social Needs    Financial resource strain: Not on file    Food insecurity - worry: Not on file    Food insecurity - inability: Not on file    Transportation needs - medical: Not on file    Transportation needs - non-medical: Not on file   Occupational History    Not on file   Tobacco Use    Smoking status: Current Every Day Smoker     Packs/day: 0.25     Types: Cigarettes    Smokeless tobacco: Never Used   Substance and Sexual Activity    Alcohol use: No    Drug use: No    Sexual activity: Not on file   Other Topics Concern    Not on file   Social History Narrative    [employer]      Past Surgical History:   Procedure Laterality Date    CHOLECYSTECTOMY      CORONARY STENT PLACEMENT      Nov 11    HERNIA REPAIR      Injection-steroid-epidural-lumbar N/A 2/11/2019    Performed by Jose [facility]hese MD at [facility] OR    INJECTION-STEROID-EPIDURAL-TRANSFORAMINAL L4 & L5 Right 4/19/2018    Per[facility] by [facility] at [facility] CATH LAB    SPINE SURGERY  1978 and 1982    x2 no hardware     Family History  "  Problem Relation Age of Onset    Cancer Mother     Lung cancer Mother     Brain cancer Mother     Clotting disorder Father     Heart disease Father     Heart disease Brother     Heart disease Brother     Alcohol abuse Brother     Alcohol abuse Brother      Vitals:    02/25/19 1003   BP: (!) 137/92   Pulse: 63   Weight: 82 kg (180 lb 12.4 oz)   Height: 5' 11" (1.803 m)       Review of Systems   Constitutional: Negative for chills, diaphoresis, fatigue, fever and unexpected weight change.   HENT: Negative for trouble swallowing.    Eyes: Negative for visual disturbance.   Respiratory: Negative for shortness of breath.    Cardiovascular: Negative for chest pain.   Gastrointestinal: Negative for abdominal pain, constipation, diarrhea, nausea and vomiting.   Genitourinary: Negative for difficulty urinating.   Musculoskeletal: Negative for arthralgias, back pain, gait problem, joint swelling, myalgias, neck pain and neck stiffness.   Neurological: Negative for dizziness, speech difficulty, weakness, light-headedness, numbness and headaches.          Objective:      Physical Exam   Constitutional: He appears well-developed and well-nourished.   No change           Assessment:       1. Lumbar radiculitis           Plan:     will get an updated MRI and per his request refer for surgical evaluation       Lumbar radiculitis  -     MRI Lumbar Spine Without Contrast; Future; Expected date: 02/25/2019  -     Ambulatory referral to Neurosurgery        "

## 2019-03-01 ENCOUNTER — TELEPHONE (OUTPATIENT)
Dept: NEUROSURGERY | Facility: CLINIC | Age: 69
End: 2019-03-01

## 2019-03-01 NOTE — TELEPHONE ENCOUNTER
----- Message from Monique Dang PA-C sent at 3/1/2019  9:12 AM CST -----  This patient needs to be reschedule on Thursday 3/7. There is already a new patient in the 1:15 spot.    He can be scheduled in the 9:45 spot or will need to be rescheduled to a different day    Thanks!

## 2019-03-07 ENCOUNTER — INITIAL CONSULT (OUTPATIENT)
Dept: NEUROSURGERY | Facility: CLINIC | Age: 69
End: 2019-03-07
Payer: MEDICARE

## 2019-03-07 VITALS
HEART RATE: 67 BPM | HEIGHT: 72 IN | SYSTOLIC BLOOD PRESSURE: 124 MMHG | BODY MASS INDEX: 24.53 KG/M2 | DIASTOLIC BLOOD PRESSURE: 82 MMHG | WEIGHT: 181.13 LBS

## 2019-03-07 DIAGNOSIS — M47.26 OSTEOARTHRITIS OF SPINE WITH RADICULOPATHY, LUMBAR REGION: Primary | ICD-10-CM

## 2019-03-07 DIAGNOSIS — Z98.890 STATUS POST LUMBAR LAMINECTOMY: ICD-10-CM

## 2019-03-07 PROCEDURE — 99215 OFFICE O/P EST HI 40 MIN: CPT | Mod: S$GLB,,, | Performed by: STUDENT IN AN ORGANIZED HEALTH CARE EDUCATION/TRAINING PROGRAM

## 2019-03-07 PROCEDURE — 99215 PR OFFICE/OUTPT VISIT, EST, LEVL V, 40-54 MIN: ICD-10-PCS | Mod: S$GLB,,, | Performed by: STUDENT IN AN ORGANIZED HEALTH CARE EDUCATION/TRAINING PROGRAM

## 2019-03-07 NOTE — PROGRESS NOTES
[facility] - Neurosurgery  Clinic Consult     Consult Requested By: Napoleon Tao MD  PCP: Enoc Whitehead MD    SUBJECTIVE:     Chief Complaint:   Chief Complaint   Patient presents with    Lumbar Spine Pain (L-Spine)     patient reports low back pain with radiating pain into the bilateral hips and legs; reports numbness and tingling in bilateral legs; patient reports no releif with KAILASH from Dr Varghese; pain 4/10       History of Present Illness:  Hany Esposito is a 68 y.o. male who presents for evaluation of chronic low back and bilateral leg pain. Patient reports history of lumbar microdiscectomy 1978 and 1982. He reports chronic left leg pain following his second surgery, but lived an active life until a fall 4 years ago. He now experiences chronic low back pain with radiation into bilateral S1 distribution of leg, L>R. He reports subjective weakness in his legs. He walks with a cane for assistance. He has difficulty with standing from seated position and sitting. He denies bowel/bladder dysfunction. He received a L5-S1 KAILASH with Dr. Varghese in February with no relief. He has not attended therapy in over 3 years.     VAS 4/10 back and bilateral legs   PHQ 19  Oswestry Disability Index 60%    Past Medical History:   Diagnosis Date    Back pain     Coronary artery disease     DDD (degenerative disc disease), cervical 2018    DDD (degenerative disc disease), lumbar 2018    DDD (degenerative disc disease), lumbar 2018    Depression     Hyperlipidemia     Myocardial infarction 2016    2 heart stents     Past Surgical History:   Procedure Laterality Date    CHOLECYSTECTOMY      CORONARY STENT PLACEMENT      Nov 11    HERNIA REPAIR      Injection-steroid-epidural-lumbar N/A 2/11/2019    [facility]    INJECTION-STEROID-EPIDURAL-TRANSFORAMINAL L4 & L5 Right 4/19/2018    [facility] at [facility] CATH LAB    SPINE SURGERY  1978 and 1982    x2 no hardware      Family History   Problem Relation Age of Onset    Cancer Mother     Lung cancer Mother     Brain cancer Mother     Clotting disorder Father     Heart disease Father     Heart disease Brother     Heart disease Brother     Alcohol abuse Brother     Alcohol abuse Brother      Social History     Tobacco Use    Smoking status: Current Every Day Smoker     Packs/day: 0.25     Types: Cigarettes    Smokeless tobacco: Never Used   Substance Use Topics    Alcohol use: No    Drug use: No      Review of patient's allergies indicates:  No Known Allergies    Current Outpatient Medications:     atorvastatin (LIPITOR) 40 MG tablet, Take 40 mg by mouth once daily., Disp: , Rfl:     clonazePAM (KLONOPIN) 1 MG tablet, Take 1 mg by mouth 2 (two) times daily as needed for Anxiety., Disp: , Rfl:     diclofenac sodium 1 % Gel, Apply 4 g topically 4 (four) times daily as needed., Disp: , Rfl:     escitalopram oxalate (LEXAPRO) 10 MG tablet, Take 10 mg by mouth once daily., Disp: , Rfl:     gabapentin (NEURONTIN) 300 MG capsule, Take 300 mg by mouth 3 (three) times daily., Disp: , Rfl:     hydrocodone-acetaminophen 10-325mg (NORCO)  mg Tab, Take 1 tablet by mouth every 6 to 8 hours as needed for Pain., Disp: , Rfl:     meloxicam (MOBIC) 15 MG tablet, Take 15 mg by mouth once daily., Disp: , Rfl:     mirtazapine (REMERON) 7.5 MG Tab, TAKE ONE TABLET BY MOUTH AT NIGHT, Disp: , Rfl: 1    Review of Systems:   Constitutional: no fever, chills or night sweats. No changes in weight   Eyes: no visual changes   ENT: no nasal congestion or sore throat   Respiratory: no cough or shortness of breath   Cardiovascular: no chest pain or palpitations   Gastrointestinal: no nausea or vomiting   Genitourinary: no hematuria or dysuria   Integument/Breast: no rash or pruritis   Hematologic/Lymphatic: no easy bruising or lymphadenopathy   Musculoskeletal: +back pain +myalgias   Neurological: no seizures or tremors    Behavioral/Psych: no auditory or visual hallucinations   Endocrine: no heat or cold intolerance       OBJECTIVE:     Vital Signs (Most Recent):  Pulse: 67 (03/07/19 0928)  BP: 124/82 (03/07/19 0928)    Physical Exam:   General: well developed, well nourished, no distress.   Neurologic: Alert and oriented. Thought content appropriate. GCS 15.   Language: No aphasia  Speech: No dysarthria  Cranial nerves: face symmetric, tongue midline, CN II-XII grossly intact.   Head: normocephalic, atraumatic  Eyes: pupils equal, round, reactive to light with accomodation, EOMI.  Neck: trachea midline, no JVD   Cardiovascular: no LE edema  Pulmonary: normal respirations, no signs of respiratory distress  Abdomen: soft, non-distended  Sensory: intact to light touch throughout  Skin: Skin is warm, dry and intact.    Motor Strength: Moves all extremities spontaneously with good tone. No abnormal movements seen. Strength exam is pain limited       Iliopsoas Quadriceps Knee  Flexion Tibialis  anterior Gastro- cnemius EHL   Lower: R 4+/5 5/5 5/5 5/5 5/5 5/5    L 4+/5 5/5 5/5 5/5 5/5 5/5     DTR's: 2+ in LE  Clonus: absent  Gait: antalgic, slow, using cane for assistance     Lumbar Spine: diffusely TTP   Straight leg raise: positive bilaterally     Positive tenderness to palpation the right gluteal/hamstring region reproducing symptoms      Diagnostic Results:  I have independently reviewed the following imaging:  MRI of his lumbar spine was evaluated from March 1, 2019, as well as lumbar x-rays patient is a previous L5 laminectomy with severe degenerative disc disease and loss of disc height associated lateral recess and foraminal stenosis.  At L4-5 he has evidence of degenerative disc disease facet arthropathy no central moderate lateral recess stenosis    Multiple reports reviewed from 2015 including previous lumbar MRI as well as hip MRI.  Hip MRI revealed gluteal muscle injury, his hamstring tendon tear  proximaly      ASSESSMENT/PLAN:     Osteoarthritis of spine with radiculopathy, lumbar region    Status post lumbar laminectomy        Hany Esposito is a 68 y.o. male  He is a long history of immobility and orthopedic pathology  He is status post lumbar laminectomy at L5-S1 remotely.  He had a fall approximately 4 years ago and has been disabled ever since  He has significant difficulty with ambulation he has back pain he has hip pain primarily on the left he has bilateral lower extremity pain  He has a history of a left hamstring tendon tear this is not been re-evaluated he will be referred to orthopedics for evaluation  He is very disabled has difficulty ambulating uses a cane he has diffuse myofascial pain in his core lower back includes with limited range of motion  His MRI does not explain his degree of disability  He is degenerative disc disease at L5-S1 lateral recess foraminal stenosis he is a previous laminectomy  He is suffering from multifactorial pathology including lumbar radiculitis post-laminectomy syndrome, severe disability from myofascial pain and muscle spasms  He has not participated in physical therapy since 2015 he has been referred to physical therapy.  Following physical therapy an orthopedic evaluation  He will return to clinic for discussion of surgery versus a possible referral for spinal cord stimulator        Patient verbalized understanding of plan. Encouraged to call with any questions or concerns.     This note was partially dictated using voice recognition software, so please excuse any errors that were not corrected.

## 2019-03-07 NOTE — LETTER
March 7, 2019      Napoleon Tao MD  [address] [place] [address] [address]  [facility] [address]           [facility] - Neurosurgery  [address] [place] [address] [address]  [facility] [address]  Phone: [phone]          Patient: Hany Esposito   MR Number: [id number]   YOB: 1950   Date of Visit: 3/7/2019       Dear Dr. Napoleon Tao:    Thank you for referring Hany Esposito to me for evaluation. Attached you will find relevant portions of my assessment and plan of care.    If you have questions, please do not hesitate to call me. I look forward to following Hany Esposito along with you.    Sincerely,    Lars Tan MD    Enclosure  CC:  No Recipients    If you would like to receive this communication electronically, please contact [email or web address] or [phone] to request more information on [employer] access.    For providers and/or their staff who would like to refer a patient to [facility], please contact us through our one-stop-shop provider referral line, [facility] , at [phone].    If you feel you have received this communication in error or would no longer like to receive these types of communications, please e-mail [email or web address]

## 2019-03-08 DIAGNOSIS — M54.50 LOW BACK PAIN, NON-SPECIFIC: Primary | ICD-10-CM

## 2019-03-14 ENCOUNTER — OFFICE VISIT (OUTPATIENT)
Dept: PAIN MEDICINE | Facility: CLINIC | Age: 69
End: 2019-03-14
Attending: ANESTHESIOLOGY
Payer: MEDICARE

## 2019-03-14 VITALS
HEIGHT: 72 IN | SYSTOLIC BLOOD PRESSURE: 114 MMHG | BODY MASS INDEX: 24.4 KG/M2 | WEIGHT: 180.13 LBS | DIASTOLIC BLOOD PRESSURE: 79 MMHG | HEART RATE: 81 BPM

## 2019-03-14 DIAGNOSIS — M50.30 DDD (DEGENERATIVE DISC DISEASE), CERVICAL: ICD-10-CM

## 2019-03-14 DIAGNOSIS — M54.12 ACUTE CERVICAL RADICULOPATHY: Primary | ICD-10-CM

## 2019-03-14 DIAGNOSIS — M54.2 CHRONIC NECK PAIN: ICD-10-CM

## 2019-03-14 DIAGNOSIS — G89.29 CHRONIC NECK PAIN: ICD-10-CM

## 2019-03-14 PROCEDURE — 99214 OFFICE O/P EST MOD 30 MIN: CPT | Mod: S$GLB,,, | Performed by: ANESTHESIOLOGY

## 2019-03-14 PROCEDURE — 99214 PR OFFICE/OUTPT VISIT, EST, LEVL IV, 30-39 MIN: ICD-10-PCS | Mod: S$GLB,,, | Performed by: ANESTHESIOLOGY

## 2019-03-14 PROCEDURE — 99999 PR PBB SHADOW E&M-EST. PATIENT-LVL III: CPT | Mod: PBBFAC,,, | Performed by: ANESTHESIOLOGY

## 2019-03-14 PROCEDURE — 99999 PR PBB SHADOW E&M-EST. PATIENT-LVL III: ICD-10-PCS | Mod: PBBFAC,,, | Performed by: ANESTHESIOLOGY

## 2019-03-14 PROCEDURE — 1101F PR PT FALLS ASSESS DOC 0-1 FALLS W/OUT INJ PAST YR: ICD-10-PCS | Mod: CPTII,S$GLB,, | Performed by: ANESTHESIOLOGY

## 2019-03-14 PROCEDURE — 1101F PT FALLS ASSESS-DOCD LE1/YR: CPT | Mod: CPTII,S$GLB,, | Performed by: ANESTHESIOLOGY

## 2019-03-14 RX ORDER — METHYLPREDNISOLONE 4 MG/1
TABLET ORAL
Qty: 1 PACKAGE | Refills: 0 | Status: SHIPPED | OUTPATIENT
Start: 2019-03-14 | End: 2019-04-04

## 2019-03-14 NOTE — PROGRESS NOTES
Chronic Pain - Established    INTERVAL HISTORY (03/14/2019):    Hany Esposito presents to the clinic today for a follow-up appointment for neck pain. The neck pain is a chronic problem. The current episode has been present for 5 days. No recent trauma or inciting event. He developed the pain while watching TV in bed. Current pain intensity is 7/10. The pain is located in the posterior cervical spine area and radiates into the right shoulder and down the arm into the thumb. The pain is made worse with turning the head, lifting the right arm, and laying down. He has tried Norco which provides moderate relief. The patient reports 100% pain relief of his neck, shoulder, and arm pain after cervical KAILASH which lasted close to 9 months.     Patient denies bowel/bladder incontinence. He reports subjective weakness in the right arm which he attributes to pain.    INTERVAL HISTORY (06/21/2018):    Hany Esposito presents to the clinic today for a follow-up appointment for neck pain. Since the last visit, the neck pain has been improving. The patient reports 100% pain relief of his neck, shoulder, and arm pain after cervical KAILASH which continues. He notes significant improvement in his functional status and sleep since the procedure. Current pain intensity in the neck is 0/10. His chief complaint today is low back pain. The back pain is located in the bilateral low back area and radiates into the buttocks and down the back of the legs into the feet. He describes the pain as sharp and stabbing. Current pain intensity is 7/10. The back pain is made worse with prolonged sitting/standing and walking. The pain is mitigated by pain medication, heat, and TENS unit. He obtained no improvement from Right L4/5 and L5/S1 TESI in April 2018.    INTERVAL HISTORY (05/10/2018):    Hany Esposito presents to the clinic today for a follow-up appointment for low back and neck pain. Since the last visit, the low back pain has been persistent. The  patient reports no improvement after Right Transforaminal epidural steroid injection at L4 and L5. Current pain intensity in the low back is 6/10. He continues to experience pain in the low back that radiates down the back of both legs. The pain has been present since a fall in a [place] bathroom.    He also continues to experience significant pain in the neck that radiates down both arms in no particular dermatomal distribution. Current pain intensity in the neck is 3-4/10. The pain is worse at nighttime. He continues to use a TENS unit on his neck and low back which helps.     SUBJECTIVE:    Hany Esposito is a 68 y/o male with hx of lumbar surgery (1978 and 1982) who presents to the clinic for the evaluation of neck and low back pain. The current pain started in April 2015 following a fall in a casino bathroom and symptoms have been worsening. The neck pain radiates into the shoulder and down both arms to the level of the forearm in no particular dermatomal distribution.  The neck pain is described as sharp, stabbing and throbbing and is rated as 6/10. The pain is rated with a score of  3/10 on the AVERAGE day and a score of 8/10 on the WORST day. The neck pain is exacerbated by head turning, neck extension, and laying in bed. He also complains of bilateral low back pain which radiates into the hips and down the back of the legs into the feet. The back pain is described as sharp, stabbing and throbbing and is rated as 7/10. The pain is rated with a score of  4/10 on the AVERAGE day and a score of 10/10 on the WORST day. The back pain is exacerbated by sitting, standing, bending, walking, getting out of bed/chair, twisting and cold weather. The back pain is more significant than the neck pain. Symptoms interfere with daily activity and sleeping.  The pain is mitigated somewhat by heat, pain medication, topical pain cream and TENS unit. He has never tried chiropractic care. He tried aquatic therapy which helped  after the injury. He reports spending 18 hours per day reclining. The patient reports 3 hours of uninterrupted sleep per night. The patient tells me this is an open  case. He has been evaluated by neurosurgery last year and it was not felt surgery was necessary.    Patient denies bowel/bladder incontinence. He reports subjective weakness in the legs and numbness in both feet.       Pain Disability Index Review:  Last 3 PDI Scores 3/14/2019 6/21/2018 5/10/2018   Pain Disability Index (PDI) 57 48 45       Pain Medications:    - Norco 10/325 QID  - Gabapentin 300 mg TID  - Mobic 15 mg daily  - Clonazepam 1 mg BID     report:  Reviewed     Pain Procedures: None     Imaging:     Cervical MRI (4/12/2018):    FINDINGS:  Alignment: There is an accentuated cervical lordosis.    Vertebrae: Normal marrow signal. No fracture.    Discs: There is diffuse disc desiccation with narrowing    Cord: Normal.    Skull base and craniocervical junction: Normal.    Degenerative findings:    C2-C3: There is disc desiccation.  There is no evidence of herniation, spinal canal stenosis.  The neural foramen or normal.    C3-C4: There is disc desiccation.  There is a small central uncovertebral bony spur which encroaches upon the anterior thecal sac.  There is normal spinal canal stenosis.  Normal foramina.    C4-C5: The disc signal is abnormal with desiccation.  There is a circumferential bulging disc which encroaches upon the anterior thecal sac.  There is no spinal cord compression.  There is mild facet hypertrophy.  Slight narrowing is noted of the left neural foramen.    C5-C6: There is evidence of disc desiccation.  There is a circumferential bulging disc which encroaches upon the anterior thecal sac. There is no spinal cord compression. There is no spinal canal stenosis.  The foramen are normal.  Mild facet hypertrophy is noted.    C6-C7: There is disc desiccation.  A small uncovertebral bony spur encroaches upon the anterior  thecal sac.  There is no spinal cord compression.  No spinal canal stenosis.  The foramina are normal.    C7-T1: The disc signal is normal.  There is now evidence of herniation or spinal canal stenosis.  Normal foramina.    Paraspinal muscles & soft tissues: Unremarkable.    Lumbar MRI (10/17/2017):    1) Stable moderate compression deformity involving the superior endplate of L1 with no new or subacute compression deformities.  2) Mild to moderate facet arthropathy L3-4 without significant foraminal restriction.  3) Moderate lumbar stenosis L4-5 predominately from hypertrophic facet arthopathy. There is moderate bilateral foraminal restriction with mild contact of the exiting nerve root on th left foramen. There is also mild contact of the traversing nerve root on the left. The findings appear more eccentric when compared to the prior study of 5/29/2015.  4) Prominent discogenic disease L5-S1 with prominent facet arthropathy. There is moderate foraminal restriction bilaterally without root contact. These findings appear generally stable when compared to the prior study.     Cervical MRI (5/29/2015):     1) Exaggerated cervical lordosis is noted which is within normal limits without acute cervical vertebral body fracture, compression fracture deformity or spondylolisthesis. Low-grade discogenic disease is present mainly at C4-5, C5-6 and C6-7 with low-grade uncovertebral joint hypertrophy at C5-6, and C6-7. No central canal stenosis is present.  2) Mild bilateral neural foraminal narrowing is present at C5-6 and C6-7 with minimal bilateral neural foraminal narrowing at C3-4.    Past Medical History:   Diagnosis Date    Back pain     Coronary artery disease     DDD (degenerative disc disease), cervical 2018    DDD (degenerative disc disease), lumbar 2018    DDD (degenerative disc disease), lumbar 2018    Depression     Hyperlipidemia     Myocardial infarction 2016    2 heart stents     Past Surgical History:    Procedure Laterality Date    CHOLECYSTECTOMY      CORONARY STENT PLACEMENT      Nov 11    HERNIA REPAIR      Injection-steroid-epidural-lumbar N/A 2/11/2019    [facility]    INJECTION-STEROID-EPIDURAL-TRANSFORAMINAL L4 & L5 Right 4/19/2018    Performed by Kamar [facility]ile III, [facility] at [facility] CATH LAB    SPINE SURGERY  1978 and 1982    x2 no hardware     Social History     Socioeconomic History    Marital status:      Spouse name: Not on file    Number of children: 2    Years of education: Not on file    Highest education level: Not on file   Social Needs    Financial resource strain: Not on file    Food insecurity - worry: Not on file    Food insecurity - inability: Not on file    Transportation needs - medical: Not on file    Transportation needs - non-medical: Not on file   Occupational History    Not on file   Tobacco Use    Smoking status: Current Every Day Smoker     Packs/day: 0.25     Types: Cigarettes    Smokeless tobacco: Never Used   Substance and Sexual Activity    Alcohol use: No    Drug use: No    Sexual activity: Not on file   Other Topics Concern    Not on file   Social History Narrative    Twin Girls      Family History   Problem Relation Age of Onset    Cancer Mother     Lung cancer Mother     Brain cancer Mother     Clotting disorder Father     Heart disease Father     Heart disease Brother     Heart disease Brother     Alcohol abuse Brother     Alcohol abuse Brother        Review of patient's allergies indicates:  No Known Allergies    Current Outpatient Medications   Medication Sig    atorvastatin (LIPITOR) 40 MG tablet Take 40 mg by mouth once daily.    clonazePAM (KLONOPIN) 1 MG tablet Take 1 mg by mouth 2 (two) times daily as needed for Anxiety.    diclofenac sodium 1 % Gel Apply 4 g topically 4 (four) times daily as needed.    escitalopram oxalate (LEXAPRO) 10 MG tablet Take 10 mg by mouth once daily.    gabapentin  (NEURONTIN) 300 MG capsule Take 300 mg by mouth 3 (three) times daily.    hydrocodone-acetaminophen 10-325mg (NORCO)  mg Tab Take 1 tablet by mouth every 6 to 8 hours as needed for Pain.    meloxicam (MOBIC) 15 MG tablet Take 15 mg by mouth once daily.    mirtazapine (REMERON) 7.5 MG Tab TAKE ONE TABLET BY MOUTH AT NIGHT    methylPREDNISolone (MEDROL DOSEPACK) 4 mg tablet use as directed     No current facility-administered medications for this visit.        REVIEW OF SYSTEMS:    GENERAL:  No weight loss, malaise or fevers.  HEENT: Negative for headaches.  NECK: + neck pain.  RESPIRATORY:  Negative for wheezing or shortness of breath.  CARDIOVASCULAR:  Negative for chest pain or palpitations.  GI:  Negative for abdominal discomfort, blood in stools or black stools or change in bowel habits.  MUSCULOSKELETAL:  See HPI.  SKIN:  Negative for lesions, rash, and itching.  PSYCH:  + sleep disturbance   HEMATOLOGY/LYMPHOLOGY:  Negative for prolonged bleeding, bruising easily or swollen nodes.  NEURO:   No history of seizures or tremors.  All other reviewed and negative other than HPI.    OBJECTIVE:    /79   Pulse 81   Ht 6' (1.829 m)   Wt 81.7 kg (180 lb 1.9 oz)   BMI 24.43 kg/m²     PHYSICAL EXAMINATION:    GENERAL: Well appearing, in no acute distress.   PSYCH:  Mood and affect is appropriate.  Awake, alert, and oriented x 3.  SKIN: Skin color, texture, turgor normal, no rashes or lesions  HEENT: Normocephalic, atraumatic.  EOM intact.  CV: Radial pulses are 2+.  RESP:  Respirations are unlabored.  GI: Abdomen soft and non-tender.  MSK:  No atrophy or tone abnormalities are noted.                 Neck: Tenderness to palpation over the cervical paraspinous muscles. Pain with neck flexion, extension, and lateral rotation.  No obvious deformity or signs of trauma.  ROM is limited.     Back: Decreased range of motion on flexion and extension.     Extremities:  Peripheral joint ROM is full and pain free  without obvious instability or laxity in all four extremities. No edema or skin discolorations noted.      Gait:  Gait is antalgic, uses cane.     NEUR:  No loss of sensation is noted.         Strength   Deltoids Triceps Biceps Wrist Extension Wrist Flexion Hand    Upper: R 5/5 4+/5 4+/5 4/5 4/5 4/5     L 5/5 5/5 5/5 5/5 5/5 5/5       ASSESSMENT: 68 y.o.  male with acute on chronic neck and right arm pain, consistent with     1. Acute cervical radiculopathy    2. DDD (degenerative disc disease), cervical    3. Chronic neck pain        PLAN:     - I have stressed the importance of physical activity and a home exercise plan to help with pain and improve health.  - Continue current medications.   - Rx Medrol dose pack. Advised to hold Mobic while taking Medrol pack.  - RTC in 2 weeks.    The above plan and management options were discussed at length with patient. Patient is in agreement with the above and verbalized understanding. It will be communicated with the referring physician via electronic record, fax, or mail.    Kamar Haile III  03/14/2019

## 2019-03-19 ENCOUNTER — HOSPITAL ENCOUNTER (OUTPATIENT)
Dept: RADIOLOGY | Facility: HOSPITAL | Age: 69
Discharge: HOME | End: 2019-03-19
Attending: ORTHOPAEDIC SURGERY
Payer: MEDICARE

## 2019-03-19 ENCOUNTER — OFFICE VISIT (OUTPATIENT)
Dept: ORTHOPEDICS | Facility: CLINIC | Age: 69
End: 2019-03-19
Payer: MEDICARE

## 2019-03-19 VITALS
WEIGHT: 180 LBS | BODY MASS INDEX: 24.38 KG/M2 | SYSTOLIC BLOOD PRESSURE: 138 MMHG | HEART RATE: 65 BPM | HEIGHT: 72 IN | DIASTOLIC BLOOD PRESSURE: 85 MMHG

## 2019-03-19 DIAGNOSIS — M79.604 RIGHT LEG PAIN: Primary | ICD-10-CM

## 2019-03-19 DIAGNOSIS — M79.604 RIGHT LEG PAIN: ICD-10-CM

## 2019-03-19 DIAGNOSIS — M54.31 SCIATICA OF RIGHT SIDE: Primary | ICD-10-CM

## 2019-03-19 PROCEDURE — 1101F PT FALLS ASSESS-DOCD LE1/YR: CPT | Mod: CPTII,S$GLB,, | Performed by: ORTHOPAEDIC SURGERY

## 2019-03-19 PROCEDURE — 99999 PR PBB SHADOW E&M-EST. PATIENT-LVL III: ICD-10-PCS | Mod: PBBFAC,,, | Performed by: ORTHOPAEDIC SURGERY

## 2019-03-19 PROCEDURE — 73552 XR FEMUR 2 VIEW RIGHT: ICD-10-PCS | Mod: 26,RT,, | Performed by: RADIOLOGY

## 2019-03-19 PROCEDURE — 73552 X-RAY EXAM OF FEMUR 2/>: CPT | Mod: TC,PN,RT

## 2019-03-19 PROCEDURE — 1101F PR PT FALLS ASSESS DOC 0-1 FALLS W/OUT INJ PAST YR: ICD-10-PCS | Mod: CPTII,S$GLB,, | Performed by: ORTHOPAEDIC SURGERY

## 2019-03-19 PROCEDURE — 99203 OFFICE O/P NEW LOW 30 MIN: CPT | Mod: S$GLB,,, | Performed by: ORTHOPAEDIC SURGERY

## 2019-03-19 PROCEDURE — 99999 PR PBB SHADOW E&M-EST. PATIENT-LVL III: CPT | Mod: PBBFAC,,, | Performed by: ORTHOPAEDIC SURGERY

## 2019-03-19 PROCEDURE — 73552 X-RAY EXAM OF FEMUR 2/>: CPT | Mod: 26,RT,, | Performed by: RADIOLOGY

## 2019-03-19 PROCEDURE — 99203 PR OFFICE/OUTPT VISIT, NEW, LEVL III, 30-44 MIN: ICD-10-PCS | Mod: S$GLB,,, | Performed by: ORTHOPAEDIC SURGERY

## 2019-03-19 NOTE — LETTER
March 19, 2019      Lars Tan MD  [address] [facility] [address]  [facility] [address]           [address] [address]  Phone: [phone]          Patient: Hany Esposito   MR Number: [id number]   YOB: 1950   Date of Visit: 3/19/2019       Dear Dr. Lars Tan:    Thank you for referring Hany Esposito to me for evaluation. Attached you will find relevant portions of my assessment and plan of care.    If you have questions, please do not hesitate to call me. I look forward to following Hany Esposito along with you.    Sincerely,    Tanvir Granda MD    Enclosure  CC:  No Recipients    If you would like to receive this communication electronically, please contact [email or web address] or [phone] to request more information on [employer] access.    For providers and/or their staff who would like to refer a patient to [facility], please contact us through our one-stop-shop provider referral line, [facility] , at [phone].    If you feel you have received this communication in error or would no longer like to receive these types of communications, please e-mail [email or web address]

## 2019-03-20 NOTE — PROGRESS NOTES
Past Medical History:   Diagnosis Date    Back pain     Coronary artery disease     DDD (degenerative disc disease), cervical 2018    DDD (degenerative disc disease), lumbar 2018    DDD (degenerative disc disease), lumbar 2018    Depression     Hyperlipidemia     Myocardial infarction 2016    2 heart stents       Past Surgical History:   Procedure Laterality Date    CHOLECYSTECTOMY      CORONARY STENT PLACEMENT      Nov 11    HERNIA REPAIR      Injection-steroid-epidural-lumbar N/A 2/11/2019    Per[facility] by [facility] at [facility]    INJECTION-STEROID-EPIDURAL-TRANSFORAMINAL L4 & L5 Right 4/19/2018    Performed by Kamar Haile III, [facility] at [facility]    SPINE SURGERY  1978 and 1982    x2 no hardware       Current Outpatient Medications   Medication Sig    atorvastatin (LIPITOR) 40 MG tablet Take 40 mg by mouth once daily.    clonazePAM (KLONOPIN) 1 MG tablet Take 1 mg by mouth 2 (two) times daily as needed for Anxiety.    diclofenac sodium 1 % Gel Apply 4 g topically 4 (four) times daily as needed.    escitalopram oxalate (LEXAPRO) 10 MG tablet Take 10 mg by mouth once daily.    gabapentin (NEURONTIN) 300 MG capsule Take 300 mg by mouth 3 (three) times daily.    hydrocodone-acetaminophen 10-325mg (NORCO)  mg Tab Take 1 tablet by mouth every 6 to 8 hours as needed for Pain.    meloxicam (MOBIC) 15 MG tablet Take 15 mg by mouth once daily.    methylPREDNISolone (MEDROL DOSEPACK) 4 mg tablet use as directed    mirtazapine (REMERON) 7.5 MG Tab TAKE ONE TABLET BY MOUTH AT NIGHT     No current facility-administered medications for this visit.        Review of patient's allergies indicates:  No Known Allergies    Family History   Problem Relation Age of Onset    Cancer Mother     Lung cancer Mother     Brain cancer Mother     Clotting disorder Father     Heart disease Father     Heart disease Brother     Heart disease Brother     Alcohol abuse Brother     Alcohol abuse  Brother        Social History     Socioeconomic History    Marital status:      Spouse name: Not on file    Number of children: 2    Years of education: Not on file    Highest education level: Not on file   Social Needs    Financial resource strain: Not on file    Food insecurity - worry: Not on file    Food insecurity - inability: Not on file    Transportation needs - medical: Not on file    Transportation needs - non-medical: Not on file   Occupational History    Not on file   Tobacco Use    Smoking status: Current Every Day Smoker     Packs/day: 0.25     Types: Cigarettes    Smokeless tobacco: Never Used   Substance and Sexual Activity    Alcohol use: No    Drug use: No    Sexual activity: Not on file   Other Topics Concern    Not on file   Social History Narrative    [employer]        Chief Complaint:   Chief Complaint   Patient presents with    Right Femur - Pain       Consulting Physician: Lars Tan MD    History of present illness:    This is a 68 y.o. male who complains of right hip and leg pain since 2016. Pain 5/10 and worse with use. Reports fall in 2016.    Review of Systems:    Constitution: Denies chills, fever, and sweats.  HENT: Denies headaches or blurry vision.  Cardiovascular: Denies chest pain or irregular heart beat.  Respiratory: Denies cough or shortness of breath.  Gastrointestinal: Denies abdominal pain, nausea, or vomiting.  Musculoskeletal:  Denies muscle cramps.  Neurological: Denies dizziness or focal weakness.  Psychiatric/Behavioral: Normal mental status.  Hematologic/Lymphatic: Denies bleeding problem or easy bruising/bleeding.  Skin: Denies rash or suspicious lesions.    Examination:    Vital Signs:    Vitals:    03/19/19 0959   BP: 138/85   Pulse: 65   Weight: 81.6 kg (180 lb)   Height: 6' (1.829 m)   PainSc:   5       Body mass index is 24.41 kg/m².    This a well-developed, well nourished patient in no acute distress.    Alert and oriented x 3 and  cooperative to examination.       Physical Exam: Right Hip Exam    Gait:   antalgic    Skin  Rash:   None  Scars:   None    Inspection  Erythema:  None  Bruising:  None  Swelling:  None  Masses:  None  Lymphadenopathy: None    Range of Motion  Limited due to back and leg pain    Leg pain with hip range of motion.    Straight Leg Raise: +  Log Roll:  Negative    Tenderness  Groin:   Negative  Greater Trochanter: Negative    Strength:  4-4+/5    Stability:  Normal    Sensation:  Intact    Vascular  Pulses:  Palpable distally          Imaging: X-rays ordered and images interpreted today personally by me of right hip appear normal.        Assessment: Sciatica of right side        Plan: Pain down leg to toes. On dosepack now. His pain does not seem hip related. Will refer back to Dr. Tao for treatment of sciatica and LBP.      DISCLAIMER: This note may have been dictated using voice recognition software and may contain grammatical errors.     NOTE: Consult report sent to referring provider via [employer].

## 2019-04-10 ENCOUNTER — OFFICE VISIT (OUTPATIENT)
Dept: SPINE | Facility: CLINIC | Age: 69
End: 2019-04-10
Payer: MEDICARE

## 2019-04-10 VITALS
SYSTOLIC BLOOD PRESSURE: 132 MMHG | HEIGHT: 72 IN | DIASTOLIC BLOOD PRESSURE: 88 MMHG | HEART RATE: 86 BPM | BODY MASS INDEX: 24.36 KG/M2 | WEIGHT: 179.88 LBS

## 2019-04-10 DIAGNOSIS — M54.42 CHRONIC BILATERAL LOW BACK PAIN WITH BILATERAL SCIATICA: Primary | ICD-10-CM

## 2019-04-10 DIAGNOSIS — M54.41 CHRONIC BILATERAL LOW BACK PAIN WITH BILATERAL SCIATICA: Primary | ICD-10-CM

## 2019-04-10 DIAGNOSIS — G89.29 CHRONIC BILATERAL LOW BACK PAIN WITH BILATERAL SCIATICA: Primary | ICD-10-CM

## 2019-04-10 PROCEDURE — 99213 OFFICE O/P EST LOW 20 MIN: CPT | Mod: ,,, | Performed by: PHYSICAL MEDICINE & REHABILITATION

## 2019-04-10 PROCEDURE — 1101F PR PT FALLS ASSESS DOC 0-1 FALLS W/OUT INJ PAST YR: ICD-10-PCS | Mod: ,,, | Performed by: PHYSICAL MEDICINE & REHABILITATION

## 2019-04-10 PROCEDURE — 99213 PR OFFICE/OUTPT VISIT, EST, LEVL III, 20-29 MIN: ICD-10-PCS | Mod: ,,, | Performed by: PHYSICAL MEDICINE & REHABILITATION

## 2019-04-10 PROCEDURE — 1101F PT FALLS ASSESS-DOCD LE1/YR: CPT | Mod: ,,, | Performed by: PHYSICAL MEDICINE & REHABILITATION

## 2019-04-10 NOTE — PROGRESS NOTES
SUBJECTIVE:    Patient ID: Hany Esposito is a 68 y.o. male.    Chief Complaint: Back Pain    He returns for a follow-up.  The last time I saw him I referred him to Dr. Tan, neuro surgery, for evaluation of his chronic back and leg pain.  The patient did have that appointment but I am not sure he completely understood the plan going forward.  Today we mostly talked about the recommendations from Dr. Tan evaluation which included starting physical therapy which the patient has done and then reconsideration of a surgical intervention versus spinal cord stimulator.        Past Medical History:   Diagnosis Date    Back pain     Coronary artery disease     DDD (degenerative disc disease), cervical 2018    DDD (degenerative disc disease), lumbar 2018    DDD (degenerative disc disease), lumbar 2018    Depression     Hyperlipidemia     Myocardial infarction 2016    2 heart stents     Social History     Socioeconomic History    Marital status:      Spouse name: Not on file    Number of children: 2    Years of education: Not on file    Highest education level: Not on file   Occupational History    Not on file   Social Needs    Financial resource strain: Not on file    Food insecurity:     Worry: Not on file     Inability: Not on file    Transportation needs:     Medical: Not on file     Non-medical: Not on file   Tobacco Use    Smoking status: Current Every Day Smoker     Packs/day: 0.25     Types: Cigarettes    Smokeless tobacco: Never Used   Substance and Sexual Activity    Alcohol use: No    Drug use: No    Sexual activity: Not on file   Lifestyle    Physical activity:     Days per week: Not on file     Minutes per session: Not on file    Stress: Not on file   Relationships    Social connections:     Talks on phone: Not on file     Gets together: Not on file     Attends Jewish service: Not on file     Active member of club or organization: Not on file     Attends meetings of clubs  or organizations: Not on file     Relationship status: Not on file   Other Topics Concern    Not on file   Social History Narrative    [employer]      Past Surgical History:   Procedure Laterality Date    CHOLECYSTECTOMY      CORONARY STENT PLACEMENT      Nov 11    HERNIA REPAIR      Injection-steroid-epidural-lumbar N/A 2/11/2019    [facility]    INJECTION-STEROID-EPIDURAL-TRANSFORAMINAL L4 & L5 Right 4/19/2018    Performed by Kamar [facility]ile III[facility] at [facility]    SPINE SURGERY  1978 and 1982    x2 no hardware     Family History   Problem Relation Age of Onset    Cancer Mother     Lung cancer Mother     Brain cancer Mother     Clotting disorder Father     Heart disease Father     Heart disease Brother     Heart disease Brother     Alcohol abuse Brother     Alcohol abuse Brother      Vitals:    04/10/19 1301   BP: 132/88   Pulse: 86   Weight: 81.6 kg (179 lb 14.3 oz)   Height: 6' (1.829 m)       Review of Systems   Constitutional: Negative for chills, diaphoresis, fatigue, fever and unexpected weight change.   HENT: Negative for trouble swallowing.    Eyes: Negative for visual disturbance.   Respiratory: Negative for shortness of breath.    Cardiovascular: Negative for chest pain.   Gastrointestinal: Negative for abdominal pain, constipation, diarrhea, nausea and vomiting.   Genitourinary: Negative for difficulty urinating.   Musculoskeletal: Negative for arthralgias, back pain, gait problem, joint swelling, myalgias, neck pain and neck stiffness.   Neurological: Negative for dizziness, speech difficulty, weakness, light-headedness, numbness and headaches.          Objective:      Physical Exam   Constitutional: He appears well-developed and well-nourished.   Not examined           Assessment:       1. Chronic bilateral low back pain with bilateral sciatica           Plan:     he should follow up with Dr. Tan after he completes his physical therapy.  I  really do not have any further suggestions for him.  He already has a pain management specialist to prescribe his medications and has a relationship with Dr. Haile for interventional pain management      Chronic bilateral low back pain with bilateral sciatica

## 2019-04-11 ENCOUNTER — OFFICE VISIT (OUTPATIENT)
Dept: PAIN MEDICINE | Facility: CLINIC | Age: 69
End: 2019-04-11
Attending: ANESTHESIOLOGY
Payer: MEDICARE

## 2019-04-11 VITALS
HEIGHT: 72 IN | SYSTOLIC BLOOD PRESSURE: 110 MMHG | HEART RATE: 78 BPM | DIASTOLIC BLOOD PRESSURE: 75 MMHG | WEIGHT: 182.63 LBS | BODY MASS INDEX: 24.74 KG/M2

## 2019-04-11 DIAGNOSIS — M50.30 DDD (DEGENERATIVE DISC DISEASE), CERVICAL: ICD-10-CM

## 2019-04-11 DIAGNOSIS — M51.36 DDD (DEGENERATIVE DISC DISEASE), LUMBAR: ICD-10-CM

## 2019-04-11 DIAGNOSIS — M54.12 ACUTE CERVICAL RADICULOPATHY: Primary | ICD-10-CM

## 2019-04-11 DIAGNOSIS — M47.26 OSTEOARTHRITIS OF SPINE WITH RADICULOPATHY, LUMBAR REGION: ICD-10-CM

## 2019-04-11 PROCEDURE — 1101F PT FALLS ASSESS-DOCD LE1/YR: CPT | Mod: CPTII,S$GLB,, | Performed by: ANESTHESIOLOGY

## 2019-04-11 PROCEDURE — 99214 OFFICE O/P EST MOD 30 MIN: CPT | Mod: S$GLB,,, | Performed by: ANESTHESIOLOGY

## 2019-04-11 PROCEDURE — 99999 PR PBB SHADOW E&M-EST. PATIENT-LVL III: CPT | Mod: PBBFAC,,, | Performed by: ANESTHESIOLOGY

## 2019-04-11 PROCEDURE — 99214 PR OFFICE/OUTPT VISIT, EST, LEVL IV, 30-39 MIN: ICD-10-PCS | Mod: S$GLB,,, | Performed by: ANESTHESIOLOGY

## 2019-04-11 PROCEDURE — 99999 PR PBB SHADOW E&M-EST. PATIENT-LVL III: ICD-10-PCS | Mod: PBBFAC,,, | Performed by: ANESTHESIOLOGY

## 2019-04-11 PROCEDURE — 1101F PR PT FALLS ASSESS DOC 0-1 FALLS W/OUT INJ PAST YR: ICD-10-PCS | Mod: CPTII,S$GLB,, | Performed by: ANESTHESIOLOGY

## 2019-04-11 NOTE — PROGRESS NOTES
Chronic Pain - Established    INTERVAL HISTORY (04/11/2019):    Hany Esposito presents to the clinic today for a follow-up appointment for neck pain. Since the last visit, the neck and right arm pain has been improving. The patient reports 100% pain relief of his neck pain after taking Medrol dose pack. Current pain intensity in the neck is 0/10.    His chief complaint today is low back pain. The pain is located in the bilateral low back and radiates down the back of the right leg into the foot and toes. He has been evaluated by neurosurgery and referred to PT. He has been participating in PT twice a week for the past 2 weeks. He notes minimal relief of his low back pain since starting PT. He had an L5/S1 interlaminar KAILASH on 2/11 with no improvement. He will follow up with neurosurgery when PT is complete.      INTERVAL HISTORY (03/14/2019):    Hany Esposito presents to the clinic today for a follow-up appointment for neck pain. The neck pain is a chronic problem. The current episode has been present for 5 days. No recent trauma or inciting event. He developed the pain while watching TV in bed. Current pain intensity is 7/10. The pain is located in the posterior cervical spine area and radiates into the right shoulder and down the arm into the thumb. The pain is made worse with turning the head, lifting the right arm, and laying down. He has tried Norco which provides moderate relief. The patient reports 100% pain relief of his neck, shoulder, and arm pain after cervical KAILASH which lasted close to 9 months.     Patient denies bowel/bladder incontinence. He reports subjective weakness in the right arm which he attributes to pain.    INTERVAL HISTORY (06/21/2018):    Hany Esposito presents to the clinic today for a follow-up appointment for neck pain. Since the last visit, the neck pain has been improving. The patient reports 100% pain relief of his neck, shoulder, and arm pain after cervical KAILASH which continues. He  notes significant improvement in his functional status and sleep since the procedure. Current pain intensity in the neck is 0/10. His chief complaint today is low back pain. The back pain is located in the bilateral low back area and radiates into the buttocks and down the back of the legs into the feet. He describes the pain as sharp and stabbing. Current pain intensity is 7/10. The back pain is made worse with prolonged sitting/standing and walking. The pain is mitigated by pain medication, heat, and TENS unit. He obtained no improvement from Right L4/5 and L5/S1 TESI in April 2018.    INTERVAL HISTORY (05/10/2018):    Hany Esposito presents to the clinic today for a follow-up appointment for low back and neck pain. Since the last visit, the low back pain has been persistent. The patient reports no improvement after Right Transforaminal epidural steroid injection at L4 and L5. Current pain intensity in the low back is 6/10. He continues to experience pain in the low back that radiates down the back of both legs. The pain has been present since a fall in a [place] bathroom.    He also continues to experience significant pain in the neck that radiates down both arms in no particular dermatomal distribution. Current pain intensity in the neck is 3-4/10. The pain is worse at nighttime. He continues to use a TENS unit on his neck and low back which helps.     SUBJECTIVE:    Hany Esposito is a 66 y/o male with hx of lumbar surgery (1978 and 1982) who presents to the clinic for the evaluation of neck and low back pain. The current pain started in April 2015 following a fall in a [place] bathroom and symptoms have been worsening. The neck pain radiates into the shoulder and down both arms to the level of the forearm in no particular dermatomal distribution.  The neck pain is described as sharp, stabbing and throbbing and is rated as 6/10. The pain is rated with a score of  3/10 on the AVERAGE day and a score of 8/10 on  the WORST day. The neck pain is exacerbated by head turning, neck extension, and laying in bed. He also complains of bilateral low back pain which radiates into the hips and down the back of the legs into the feet. The back pain is described as sharp, stabbing and throbbing and is rated as 7/10. The pain is rated with a score of  4/10 on the AVERAGE day and a score of 10/10 on the WORST day. The back pain is exacerbated by sitting, standing, bending, walking, getting out of bed/chair, twisting and cold weather. The back pain is more significant than the neck pain. Symptoms interfere with daily activity and sleeping.  The pain is mitigated somewhat by heat, pain medication, topical pain cream and TENS unit. He has never tried chiropractic care. He tried aquatic therapy which helped after the injury. He reports spending 18 hours per day reclining. The patient reports 3 hours of uninterrupted sleep per night. The patient tells me this is an open  case. He has been evaluated by neurosurgery last year and it was not felt surgery was necessary.    Patient denies bowel/bladder incontinence. He reports subjective weakness in the legs and numbness in both feet.       Pain Disability Index Review:  Last 3 PDI Scores 3/14/2019 6/21/2018 5/10/2018   Pain Disability Index (PDI) 57 [phone] 45       Pain Medications:    - Norco 10/325 QID  - Gabapentin 300 mg TID  - Mobic 15 mg daily  - Clonazepam 1 mg BID     report:  Reviewed     Pain Procedures: None     Imaging:     CT LUMBAR SPINE WITHOUT CONTRAST (3/11/2019):      FINDINGS:  Chronic moderate superior endplate compression fracture deformity of the L1 vertebral body noted unchanged as compared to prior lumbar spine series 03/29/2018.  No associated compromise of the central spinal canal.    Remaining lumbar vertebral bodies as well as T12 are normal in height.    Minimal anterolisthesis L4 on L5 noted and appears related to severe facet arthropathy at this level.   Alignment is otherwise anatomic.  Degenerative moderately severe loss of height of the L5-S1 disc noted, remaining lumbar discs appear fairly well maintained in height.    L4-5, severe bilateral facet arthropathy is present including hypertrophy of the facet joints with vacuum facets.  Associated very mild anterolisthesis L4 on L5 again noted with mild unroofing of the posterior disc margin and perhaps mild annular bulging.  Calcification of the disc noted.  Mild bilateral foraminal narrowing is present.    L5-S1, moderately severe degenerative loss of height of the disc is present with suspected mild underlying bulging of the disc and with superimposed right posterior osteophyte resulting in partial effacement of the right lateral recess and appears to contact the proximal right S1 root sleeve.  Left posterolateral spondylosis is also present with moderate narrowing of the left foramen.    No disc herniation, canal compromise foraminal compromise appreciated remaining lumbar or visualized lower thoracic disc levels.       Cervical MRI (4/12/2018):    FINDINGS:  Alignment: There is an accentuated cervical lordosis.    Vertebrae: Normal marrow signal. No fracture.    Discs: There is diffuse disc desiccation with narrowing    Cord: Normal.    Skull base and craniocervical junction: Normal.    Degenerative findings:    C2-C3: There is disc desiccation.  There is no evidence of herniation, spinal canal stenosis.  The neural foramen or normal.    C3-C4: There is disc desiccation.  There is a small central uncovertebral bony spur which encroaches upon the anterior thecal sac.  There is normal spinal canal stenosis.  Normal foramina.    C4-C5: The disc signal is abnormal with desiccation.  There is a circumferential bulging disc which encroaches upon the anterior thecal sac.  There is no spinal cord compression.  There is mild facet hypertrophy.  Slight narrowing is noted of the left neural foramen.    C5-C6: There is  evidence of disc desiccation.  There is a circumferential bulging disc which encroaches upon the anterior thecal sac. There is no spinal cord compression. There is no spinal canal stenosis.  The foramen are normal.  Mild facet hypertrophy is noted.    C6-C7: There is disc desiccation.  A small uncovertebral bony spur encroaches upon the anterior thecal sac.  There is no spinal cord compression.  No spinal canal stenosis.  The foramina are normal.    C7-T1: The disc signal is normal.  There is now evidence of herniation or spinal canal stenosis.  Normal foramina.    Paraspinal muscles & soft tissues: Unremarkable.    Lumbar MRI (10/17/2017):    1) Stable moderate compression deformity involving the superior endplate of L1 with no new or subacute compression deformities.  2) Mild to moderate facet arthropathy L3-4 without significant foraminal restriction.  3) Moderate lumbar stenosis L4-5 predominately from hypertrophic facet arthopathy. There is moderate bilateral foraminal restriction with mild contact of the exiting nerve root on th left foramen. There is also mild contact of the traversing nerve root on the left. The findings appear more eccentric when compared to the prior study of 5/29/2015.  4) Prominent discogenic disease L5-S1 with prominent facet arthropathy. There is moderate foraminal restriction bilaterally without root contact. These findings appear generally stable when compared to the prior study.     Cervical MRI (5/29/2015):     1) Exaggerated cervical lordosis is noted which is within normal limits without acute cervical vertebral body fracture, compression fracture deformity or spondylolisthesis. Low-grade discogenic disease is present mainly at C4-5, C5-6 and C6-7 with low-grade uncovertebral joint hypertrophy at C5-6, and C6-7. No central canal stenosis is present.  2) Mild bilateral neural foraminal narrowing is present at C5-6 and C6-7 with minimal bilateral neural foraminal narrowing at  C3-4.    Past Medical History:   Diagnosis Date    Back pain     Coronary artery disease     DDD (degenerative disc disease), cervical 2018    DDD (degenerative disc disease), lumbar 2018    DDD (degenerative disc disease), lumbar 2018    Depression     Hyperlipidemia     Myocardial infarction 2016    2 heart stents     Past Surgical History:   Procedure Laterality Date    CHOLECYSTECTOMY      CORONARY STENT PLACEMENT      Nov 11    HERNIA REPAIR      Injection-steroid-epidural-lumbar N/A 2/11/2019    [facility]    INJECTION-STEROID-EPIDURAL-TRANSFORAMINAL L4 & L5 Right 4/19/2018    Performed by Kamar Haile III, MD at [facility] C[facility]    SPINE SURGERY  1978 and 1982    x2 no hardware     Social History     Socioeconomic History    Marital status:      Spouse name: Not on file    Number of children: 2    Years of education: Not on file    Highest education level: Not on file   Occupational History    Not on file   Social Needs    Financial resource strain: Not on file    Food insecurity:     Worry: Not on file     Inability: Not on file    Transportation needs:     Medical: Not on file     Non-medical: Not on file   Tobacco Use    Smoking status: Current Every Day Smoker     Packs/day: 0.25     Types: Cigarettes    Smokeless tobacco: Never Used   Substance and Sexual Activity    Alcohol use: No    Drug use: No    Sexual activity: Not on file   Lifestyle    Physical activity:     Days per week: Not on file     Minutes per session: Not on file    Stress: Not on file   Relationships    Social connections:     Talks on phone: Not on file     Gets together: Not on file     Attends Gnosticism service: Not on file     Active member of club or organization: Not on file     Attends meetings of clubs or organizations: Not on file     Relationship status: Not on file   Other Topics Concern    Not on file   Social History Narrative    Twin Girls       Family History   Problem Relation Age of Onset    Cancer Mother     Lung cancer Mother     Brain cancer Mother     Clotting disorder Father     Heart disease Father     Heart disease Brother     Heart disease Brother     Alcohol abuse Brother     Alcohol abuse Brother        Review of patient's allergies indicates:  No Known Allergies    Current Outpatient Medications   Medication Sig    atorvastatin (LIPITOR) 40 MG tablet Take 40 mg by mouth once daily.    clonazePAM (KLONOPIN) 1 MG tablet Take 1 mg by mouth 2 (two) times daily as needed for Anxiety.    diclofenac sodium 1 % Gel Apply 4 g topically 4 (four) times daily as needed.    escitalopram oxalate (LEXAPRO) 10 MG tablet Take 10 mg by mouth once daily.    gabapentin (NEURONTIN) 300 MG capsule Take 300 mg by mouth 3 (three) times daily.    hydrocodone-acetaminophen 10-325mg (NORCO)  mg Tab Take 1 tablet by mouth every 6 to 8 hours as needed for Pain.    meloxicam (MOBIC) 15 MG tablet Take 15 mg by mouth once daily.    mirtazapine (REMERON) 7.5 MG Tab TAKE ONE TABLET BY MOUTH AT NIGHT     No current facility-administered medications for this visit.        REVIEW OF SYSTEMS:    GENERAL:  No weight loss, malaise or fevers.  HEENT: Negative for headaches.  NECK: + neck pain.  RESPIRATORY:  Negative for wheezing or shortness of breath.  CARDIOVASCULAR:  Negative for chest pain or palpitations.  GI:  Negative for abdominal discomfort, blood in stools or black stools or change in bowel habits.  MUSCULOSKELETAL:  See HPI.  SKIN:  Negative for lesions, rash, and itching.  PSYCH:  + sleep disturbance   HEMATOLOGY/LYMPHOLOGY:  Negative for prolonged bleeding, bruising easily or swollen nodes.  NEURO:   No history of seizures or tremors.  All other reviewed and negative other than HPI.    OBJECTIVE:    /75   Pulse 78   Ht 6' (1.829 m)   Wt 82.9 kg (182 lb 10.4 oz)   BMI 24.77 kg/m²     PHYSICAL EXAMINATION:    GENERAL: Well appearing,  in no acute distress.   PSYCH:  Mood and affect is appropriate.  Awake, alert, and oriented x 3.  SKIN: Skin color, texture, turgor normal, no rashes or lesions  HEENT: Normocephalic, atraumatic.  EOM intact.  CV: Radial pulses are 2+.  RESP:  Respirations are unlabored.  GI: Abdomen soft and non-tender.  MSK:  No atrophy or tone abnormalities are noted.                 Neck: No tenderness to palpation over the cervical paraspinous muscles. No pain with neck flexion, extension, and lateral rotation.  No obvious deformity or signs of trauma.  Full ROM.     Back: SLR + for radicular pain on left and right. Tenderness to palpation over the bilateral lumbar paraspinous muscles. Decreased range of motion on flexion and extension.     Extremities:  Peripheral joint ROM is full and pain free without obvious instability or laxity in all four extremities. No edema or skin discolorations noted.      Gait:  Gait is antalgic, uses cane.     NEUR:  No loss of sensation is noted.     Strength testing:    Right hip flexion: 4+/5  Left hip flexion: 5/5  Right knee extension: 4+/5  Left knee extension: 5/5  Right knee flexion: 4+/5  Left knee flexion: 5/5  Right ankle dorsiflexion: 5/5  Left ankle dorsiflexion: 5/5    ASSESSMENT: 69 y/o male with acute cervical radicular pain which has resolved after Medrol dose pack. He remains in PT for chronic low back and right leg pain.    1. Acute cervical radiculopathy    2. DDD (degenerative disc disease), cervical    3. DDD (degenerative disc disease), lumbar    4. Osteoarthritis of spine with radiculopathy, lumbar region        PLAN:     - I have stressed the importance of physical activity and a home exercise plan to help with pain and improve health.  - Continue current medications.   - Continue PT for low back.  - Return to clinic with any new or worsening symptoms, or if symptoms persist.      The above plan and management options were discussed at length with patient. Patient is in  agreement with the above and verbalized understanding. It will be communicated with the referring physician via electronic record, fax, or mail.    Kamar Haile III  04/11/2019

## 2019-05-16 ENCOUNTER — OFFICE VISIT (OUTPATIENT)
Dept: NEUROSURGERY | Facility: CLINIC | Age: 69
End: 2019-05-16
Payer: MEDICARE

## 2019-05-16 DIAGNOSIS — M54.50 LOW BACK PAIN, NON-SPECIFIC: Primary | ICD-10-CM

## 2019-05-16 PROCEDURE — 1101F PT FALLS ASSESS-DOCD LE1/YR: CPT | Mod: CPTII,S$GLB,, | Performed by: STUDENT IN AN ORGANIZED HEALTH CARE EDUCATION/TRAINING PROGRAM

## 2019-05-16 PROCEDURE — 99213 OFFICE O/P EST LOW 20 MIN: CPT | Mod: S$GLB,,, | Performed by: STUDENT IN AN ORGANIZED HEALTH CARE EDUCATION/TRAINING PROGRAM

## 2019-05-16 PROCEDURE — 99213 PR OFFICE/OUTPT VISIT, EST, LEVL III, 20-29 MIN: ICD-10-PCS | Mod: S$GLB,,, | Performed by: STUDENT IN AN ORGANIZED HEALTH CARE EDUCATION/TRAINING PROGRAM

## 2019-05-16 PROCEDURE — 1101F PR PT FALLS ASSESS DOC 0-1 FALLS W/OUT INJ PAST YR: ICD-10-PCS | Mod: CPTII,S$GLB,, | Performed by: STUDENT IN AN ORGANIZED HEALTH CARE EDUCATION/TRAINING PROGRAM

## 2019-05-16 NOTE — PROGRESS NOTES
[place] - Neurosurgery  Clinic Consult     Consult Requested By: No ref. provider found  PCP: Enoc Whitehead MD    SUBJECTIVE:     Chief Complaint:   No chief complaint on file.   Interval history    Patient returns today he started physical therapy.  He has had significant improvement.  He is able to sit from stand.  He was trained on how ambulate with his cane with less pain.  He is in increased his distance.  Previously walking 2-3 blocks now he has double to triple that  His chief complaint remains back pain  He is a complex history vertebral compression fracture which is healed without deformity, multilevel lumbar spondylosis  We again reviewed his imaging, his treatment options, his history  I think he is poor surgical candidate unlikely to benefit.  He has complex pathology myofascial pain  He will continue to work with pain management for strategies, he will continue to work on his flexibility and mobility with physical therapy  Exam evaluated by interventional pain management, he is interested in considering medial branch blocks possible rhizotomy is  He will discuss spinal cord stimulation      Visit 03/19  History of Present Illness:  Hany Esposito is a 68 y.o. male who presents for evaluation of chronic low back and bilateral leg pain. Patient reports history of lumbar microdiscectomy 1978 and 1982. He reports chronic left leg pain following his second surgery, but lived an active life until a fall 4 years ago. He now experiences chronic low back pain with radiation into bilateral S1 distribution of leg, L>R. He reports subjective weakness in his legs. He walks with a cane for assistance. He has difficulty with standing from seated position and sitting. He denies bowel/bladder dysfunction. He received a L5-S1 KAILASH with Dr. Varghese in February with no relief. He has not attended therapy in over 3 years.     VAS 4/10 back and bilateral legs   PHQ 19  Oswestry Disability Index 60%    Past Medical History:    Diagnosis Date    Back pain     Coronary artery disease     DDD (degenerative disc disease), cervical 2018    DDD (degenerative disc disease), lumbar 2018    DDD (degenerative disc disease), lumbar 2018    Depression     Hyperlipidemia     Myocardial infarction 2016    2 heart stents     Past Surgical History:   Procedure Laterality Date    CHOLECYSTECTOMY      CORONARY STENT PLACEMENT      Nov 11    HERNIA REPAIR      Injection-steroid-epidural-lumbar N/A 2/11/2019    Performed by Jose [facility]se MD at [facility] OR    INJECTION-STEROID-EPIDURAL-TRANSFORAMINAL L4 & L5 Right 4/19/2018    [facility] at [facility] CATH LAB    SPINE SURGERY  1978 and 1982    x2 no hardware     Family History   Problem Relation Age of Onset    Cancer Mother     Lung cancer Mother     Brain cancer Mother     Clotting disorder Father     Heart disease Father     Heart disease Brother     Heart disease Brother     Alcohol abuse Brother     Alcohol abuse Brother      Social History     Tobacco Use    Smoking status: Current Every Day Smoker     Packs/day: 0.25     Types: Cigarettes    Smokeless tobacco: Never Used   Substance Use Topics    Alcohol use: No    Drug use: No      Review of patient's allergies indicates:  No Known Allergies    Current Outpatient Medications:     atorvastatin (LIPITOR) 40 MG tablet, Take 40 mg by mouth once daily., Disp: , Rfl:     clonazePAM (KLONOPIN) 1 MG tablet, Take 1 mg by mouth 2 (two) times daily as needed for Anxiety., Disp: , Rfl:     diclofenac sodium 1 % Gel, Apply 4 g topically 4 (four) times daily as needed., Disp: , Rfl:     escitalopram oxalate (LEXAPRO) 10 MG tablet, Take 10 mg by mouth once daily., Disp: , Rfl:     gabapentin (NEURONTIN) 300 MG capsule, Take 300 mg by mouth 3 (three) times daily., Disp: , Rfl:     hydrocodone-acetaminophen 10-325mg (NORCO)  mg Tab, Take 1 tablet by mouth every 6 to 8 hours as needed for Pain., Disp: ,  Rfl:     meloxicam (MOBIC) 15 MG tablet, Take 15 mg by mouth once daily., Disp: , Rfl:     mirtazapine (REMERON) 7.5 MG Tab, TAKE ONE TABLET BY MOUTH AT NIGHT, Disp: , Rfl: 1    Review of Systems:   Constitutional: no fever, chills or night sweats. No changes in weight   Eyes: no visual changes   ENT: no nasal congestion or sore throat   Respiratory: no cough or shortness of breath   Cardiovascular: no chest pain or palpitations   Gastrointestinal: no nausea or vomiting   Genitourinary: no hematuria or dysuria   Integument/Breast: no rash or pruritis   Hematologic/Lymphatic: no easy bruising or lymphadenopathy   Musculoskeletal: +back pain +myalgias   Neurological: no seizures or tremors   Behavioral/Psych: no auditory or visual hallucinations   Endocrine: no heat or cold intolerance       OBJECTIVE:     Vital Signs (Most Recent):       Physical Exam:   General: well developed, well nourished, no distress.   Neurologic: Alert and oriented. Thought content appropriate. GCS 15.   Language: No aphasia  Speech: No dysarthria  Cranial nerves: face symmetric, tongue midline, CN II-XII grossly intact.   Head: normocephalic, atraumatic  Eyes: pupils equal, round, reactive to light with accomodation, EOMI.  Neck: trachea midline, no JVD   Cardiovascular: no LE edema  Pulmonary: normal respirations, no signs of respiratory distress  Abdomen: soft, non-distended  Sensory: intact to light touch throughout  Skin: Skin is warm, dry and intact.    Motor Strength: Moves all extremities spontaneously with good tone. No abnormal movements seen. Strength exam is pain limited       Iliopsoas Quadriceps Knee  Flexion Tibialis  anterior Gastro- cnemius EHL   Lower: R 4+/5 5/5 5/5 5/5 5/5 5/5    L 4+/5 5/5 5/5 5/5 5/5 5/5     DTR's: 2+ in LE  Clonus: absent  Gait: antalgic, slow, using cane for assistance     Lumbar Spine: diffusely TTP   Straight leg raise: positive bilaterally     Positive tenderness to palpation the right  gluteal/hamstring region reproducing symptoms      Diagnostic Results:  I have independently reviewed the following imaging:  MRI of his lumbar spine was evaluated from March 1, 2019, as well as lumbar x-rays patient is a previous L5 laminectomy with severe degenerative disc disease and loss of disc height associated lateral recess and foraminal stenosis.  At L4-5 he has evidence of degenerative disc disease facet arthropathy no central moderate lateral recess stenosis    Multiple reports reviewed from 2015 including previous lumbar MRI as well as hip MRI.  Hip MRI revealed gluteal muscle injury, his hamstring tendon tear proximaly      ASSESSMENT/PLAN:     There are no diagnoses linked to this encounter.    Hany Esposito is a 68 y.o. male  He is a long history of immobility and orthopedic pathology  He is status post lumbar laminectomy at L5-S1 remotely.  He had a fall approximately 4 years ago and has been disabled ever since  He has significant difficulty with ambulation he has back pain he has hip pain primarily on the left he has bilateral lower extremity pain  He has a history of a left hamstring tendon tear this is not been re-evaluated he will be referred to orthopedics for evaluation  He is very disabled has difficulty ambulating uses a cane he has diffuse myofascial pain in his core lower back includes with limited range of motion  His MRI does not explain his degree of disability  He is degenerative disc disease at L5-S1 lateral recess foraminal stenosis he is a previous laminectomy  He is suffering from multifactorial pathology including lumbar radiculitis post-laminectomy syndrome, severe disability from myofascial pain and muscle spasms  He has not participated in physical therapy since 2015 he has been referred to physical therapy.  Following physical therapy an orthopedic evaluation  He will return to clinic for discussion of surgery versus a possible referral for spinal cord stimulator    Interval  summary    I think he is poor surgical candidate unlikely to benefit.  He has complex pathology myofascial pain  He will continue to work with pain management for strategies, he will continue to work on his flexibility and mobility with physical therapy  Exam evaluated by interventional pain management, he is interested in considering medial branch blocks possible rhizotomy is  He will discuss spinal cord stimulation  Return as needed  Id is well educated he has had a history of cervical neck pain radiculopathy, which has resolved with injections and oral medications, he will continue to manage nonoperatively    Patient verbalized understanding of plan. Encouraged to call with any questions or concerns.     This note was partially dictated using voice recognition software, so please excuse any errors that were not corrected.

## 2019-05-20 ENCOUNTER — TELEPHONE (OUTPATIENT)
Dept: PAIN MEDICINE | Facility: CLINIC | Age: 69
End: 2019-05-20

## 2019-05-20 NOTE — TELEPHONE ENCOUNTER
Able to speak with patient's daughter Erika to inform her that the appointment for her father with IPM would have to be rescheduled. She stated that she will discuss this with her father and they will call the clinic to set a new appointment. No further issues discussed.

## 2019-05-20 NOTE — TELEPHONE ENCOUNTER
Left call back message. Calling patient to reschedule his appointment with IPM to the afternoon or another day as the provider will not be available at the current time of 8am on 5/21/19.

## 2019-05-21 ENCOUNTER — TELEPHONE (OUTPATIENT)
Dept: PAIN MEDICINE | Facility: CLINIC | Age: 69
End: 2019-05-21

## 2019-05-21 ENCOUNTER — OFFICE VISIT (OUTPATIENT)
Dept: PAIN MEDICINE | Facility: CLINIC | Age: 69
End: 2019-05-21
Attending: ANESTHESIOLOGY
Payer: MEDICARE

## 2019-05-21 DIAGNOSIS — M47.26 OSTEOARTHRITIS OF SPINE WITH RADICULOPATHY, LUMBAR REGION: ICD-10-CM

## 2019-05-21 DIAGNOSIS — M48.061 LUMBAR FORAMINAL STENOSIS: ICD-10-CM

## 2019-05-21 DIAGNOSIS — M47.816 LUMBAR SPONDYLOSIS: Primary | ICD-10-CM

## 2019-05-21 DIAGNOSIS — M51.36 DDD (DEGENERATIVE DISC DISEASE), LUMBAR: ICD-10-CM

## 2019-05-21 PROCEDURE — 1101F PR PT FALLS ASSESS DOC 0-1 FALLS W/OUT INJ PAST YR: ICD-10-PCS | Mod: CPTII,S$GLB,, | Performed by: ANESTHESIOLOGY

## 2019-05-21 PROCEDURE — 99999 PR PBB SHADOW E&M-EST. PATIENT-LVL II: CPT | Mod: PBBFAC,,, | Performed by: ANESTHESIOLOGY

## 2019-05-21 PROCEDURE — 1101F PT FALLS ASSESS-DOCD LE1/YR: CPT | Mod: CPTII,S$GLB,, | Performed by: ANESTHESIOLOGY

## 2019-05-21 PROCEDURE — 99213 OFFICE O/P EST LOW 20 MIN: CPT | Mod: S$GLB,,, | Performed by: ANESTHESIOLOGY

## 2019-05-21 PROCEDURE — 99999 PR PBB SHADOW E&M-EST. PATIENT-LVL II: ICD-10-PCS | Mod: PBBFAC,,, | Performed by: ANESTHESIOLOGY

## 2019-05-21 PROCEDURE — 99213 PR OFFICE/OUTPT VISIT, EST, LEVL III, 20-29 MIN: ICD-10-PCS | Mod: S$GLB,,, | Performed by: ANESTHESIOLOGY

## 2019-05-21 NOTE — TELEPHONE ENCOUNTER
----- Message from Caren Galvan MA sent at 5/21/2019  8:54 AM CDT -----  Contact: self      ----- Message -----  From: Yuliya May  Sent: 5/21/2019   8:32 AM  To: Mic GONZALEZ Staff    Type:  Patient Returning Call    Who Called:  Patient   Who Left Message for Patient:  Tanvir   Does the patient know what this is regarding?:yes   Best Call Back Number: [phone] (home)     Additional Information: patient requesting for Tanvir to help him reschedule appt for today ,patient states he has questions

## 2019-05-21 NOTE — PROGRESS NOTES
Chronic Pain - Established    INTERVAL HISTORY (05/21/2019):    Hany Esposito presents to the clinic today for a follow-up appointment for low back pain. Since the last visit, the back pain has been persistent. The pain is located in the bilateral low back area with radiation into the hips and down the back of the legs. The low back pain is more significant than the leg pain. Current pain intensity is 6/10. He describes the back pain as aching in nature. The back pain is made worse with activity. He has been attending PT and has noticed significant improvement in lower extremity strength and posture. He would like to discuss medial branch block and possible RFA.    Pain Disability Index Review:  Last 3 PDI Scores 5/21/2019 3/14/2019 6/21/2018   Pain Disability Index (PDI) 56 57 48       Pain Medications:    - Norco 10/325 QID  - Gabapentin 300 mg TID  - Mobic 15 mg daily  - Clonazepam 1 mg BID     report:  Reviewed     Pain Procedures: None     Imaging:     CT LUMBAR SPINE WITHOUT CONTRAST (3/11/2019):      FINDINGS:  Chronic moderate superior endplate compression fracture deformity of the L1 vertebral body noted unchanged as compared to prior lumbar spine series 03/29/2018.  No associated compromise of the central spinal canal.    Remaining lumbar vertebral bodies as well as T12 are normal in height.    Minimal anterolisthesis L4 on L5 noted and appears related to severe facet arthropathy at this level.  Alignment is otherwise anatomic.  Degenerative moderately severe loss of height of the L5-S1 disc noted, remaining lumbar discs appear fairly well maintained in height.    L4-5, severe bilateral facet arthropathy is present including hypertrophy of the facet joints with vacuum facets.  Associated very mild anterolisthesis L4 on L5 again noted with mild unroofing of the posterior disc margin and perhaps mild annular bulging.  Calcification of the disc noted.  Mild bilateral foraminal narrowing is  present.    L5-S1, moderately severe degenerative loss of height of the disc is present with suspected mild underlying bulging of the disc and with superimposed right posterior osteophyte resulting in partial effacement of the right lateral recess and appears to contact the proximal right S1 root sleeve.  Left posterolateral spondylosis is also present with moderate narrowing of the left foramen.    No disc herniation, canal compromise foraminal compromise appreciated remaining lumbar or visualized lower thoracic disc levels.       Cervical MRI (4/12/2018):    FINDINGS:  Alignment: There is an accentuated cervical lordosis.    Vertebrae: Normal marrow signal. No fracture.    Discs: There is diffuse disc desiccation with narrowing    Cord: Normal.    Skull base and craniocervical junction: Normal.    Degenerative findings:    C2-C3: There is disc desiccation.  There is no evidence of herniation, spinal canal stenosis.  The neural foramen or normal.    C3-C4: There is disc desiccation.  There is a small central uncovertebral bony spur which encroaches upon the anterior thecal sac.  There is normal spinal canal stenosis.  Normal foramina.    C4-C5: The disc signal is abnormal with desiccation.  There is a circumferential bulging disc which encroaches upon the anterior thecal sac.  There is no spinal cord compression.  There is mild facet hypertrophy.  Slight narrowing is noted of the left neural foramen.    C5-C6: There is evidence of disc desiccation.  There is a circumferential bulging disc which encroaches upon the anterior thecal sac. There is no spinal cord compression. There is no spinal canal stenosis.  The foramen are normal.  Mild facet hypertrophy is noted.    C6-C7: There is disc desiccation.  A small uncovertebral bony spur encroaches upon the anterior thecal sac.  There is no spinal cord compression.  No spinal canal stenosis.  The foramina are normal.    C7-T1: The disc signal is normal.  There is now  evidence of herniation or spinal canal stenosis.  Normal foramina.    Paraspinal muscles & soft tissues: Unremarkable.    Lumbar MRI (10/17/2017):    1) Stable moderate compression deformity involving the superior endplate of L1 with no new or subacute compression deformities.  2) Mild to moderate facet arthropathy L3-4 without significant foraminal restriction.  3) Moderate lumbar stenosis L4-5 predominately from hypertrophic facet arthopathy. There is moderate bilateral foraminal restriction with mild contact of the exiting nerve root on th left foramen. There is also mild contact of the traversing nerve root on the left. The findings appear more eccentric when compared to the prior study of 5/29/2015.  4) Prominent discogenic disease L5-S1 with prominent facet arthropathy. There is moderate foraminal restriction bilaterally without root contact. These findings appear generally stable when compared to the prior study.     Cervical MRI (5/29/2015):     1) Exaggerated cervical lordosis is noted which is within normal limits without acute cervical vertebral body fracture, compression fracture deformity or spondylolisthesis. Low-grade discogenic disease is present mainly at C4-5, C5-6 and C6-7 with low-grade uncovertebral joint hypertrophy at C5-6, and C6-7. No central canal stenosis is present.  2) Mild bilateral neural foraminal narrowing is present at C5-6 and C6-7 with minimal bilateral neural foraminal narrowing at C3-4.    Past Medical History:   Diagnosis Date    Back pain     Coronary artery disease     DDD (degenerative disc disease), cervical 2018    DDD (degenerative disc disease), lumbar 2018    DDD (degenerative disc disease), lumbar 2018    Depression     Hyperlipidemia     Myocardial infarction 2016    2 heart stents     Past Surgical History:   Procedure Laterality Date    CHOLECYSTECTOMY      CORONARY STENT PLACEMENT      Nov 11    HERNIA REPAIR      Injection-steroid-epidural-lumbar N/A  2/11/2019    [facility] at [facility] OR    INJECTION-STEROID-EPIDURAL-TRANSFORAMINAL L4 & L5 Right 4/19/2018    [facility] CATH LAB    SPINE SURGERY  1978 and 1982    x2 no hardware     Social History     Socioeconomic History    Marital status:      Spouse name: Not on file    Number of children: 2    Years of education: Not on file    Highest education level: Not on file   Occupational History    Not on file   Social Needs    Financial resource strain: Not on file    Food insecurity:     Worry: Not on file     Inability: Not on file    Transportation needs:     Medical: Not on file     Non-medical: Not on file   Tobacco Use    Smoking status: Current Every Day Smoker     Packs/day: 0.25     Types: Cigarettes    Smokeless tobacco: Never Used   Substance and Sexual Activity    Alcohol use: No    Drug use: No    Sexual activity: Not on file   Lifestyle    Physical activity:     Days per week: Not on file     Minutes per session: Not on file    Stress: Not on file   Relationships    Social connections:     Talks on phone: Not on file     Gets together: Not on file     Attends Hoahaoism service: Not on file     Active member of club or organization: Not on file     Attends meetings of clubs or organizations: Not on file     Relationship status: Not on file   Other Topics Concern    Not on file   Social History Narrative    Twin Girls      Family History   Problem Relation Age of Onset    Cancer Mother     Lung cancer Mother     Brain cancer Mother     Clotting disorder Father     Heart disease Father     Heart disease Brother     Heart disease Brother     Alcohol abuse Brother     Alcohol abuse Brother        Review of patient's allergies indicates:  No Known Allergies    Current Outpatient Medications   Medication Sig    atorvastatin (LIPITOR) 40 MG tablet Take 40 mg by mouth once daily.    clonazePAM (KLONOPIN) 1 MG tablet Take  1 mg by mouth 2 (two) times daily as needed for Anxiety.    diclofenac sodium 1 % Gel Apply 4 g topically 4 (four) times daily as needed.    escitalopram oxalate (LEXAPRO) 10 MG tablet Take 10 mg by mouth once daily.    gabapentin (NEURONTIN) 300 MG capsule Take 300 mg by mouth 3 (three) times daily.    hydrocodone-acetaminophen 10-325mg (NORCO)  mg Tab Take 1 tablet by mouth every 6 to 8 hours as needed for Pain.    meloxicam (MOBIC) 15 MG tablet Take 15 mg by mouth once daily.    mirtazapine (REMERON) 7.5 MG Tab TAKE ONE TABLET BY MOUTH AT NIGHT     No current facility-administered medications for this visit.        REVIEW OF SYSTEMS:    GENERAL:  No weight loss, malaise or fevers.  HEENT: Negative for headaches.  NECK: Negative for neck pain.  RESPIRATORY:  Negative for wheezing or shortness of breath.  CARDIOVASCULAR:  Negative for chest pain or palpitations.  GI:  Negative for abdominal discomfort, blood in stools or black stools or change in bowel habits.  MUSCULOSKELETAL:  See HPI.  SKIN:  Negative for lesions, rash, and itching.  PSYCH:  + sleep disturbance   HEMATOLOGY/LYMPHOLOGY:  Negative for prolonged bleeding, bruising easily or swollen nodes.  NEURO:   No history of seizures or tremors.  All other reviewed and negative other than HPI.    OBJECTIVE:    There were no vitals taken for this visit.    PHYSICAL EXAMINATION:    GENERAL: Well appearing, in no acute distress.   PSYCH:  Mood and affect is appropriate.  Awake, alert, and oriented x 3.  SKIN: Skin color, texture, turgor normal, no rashes or lesions  HEENT: Normocephalic, atraumatic.  EOM intact.  CV: Radial pulses are 2+.  RESP:  Respirations are unlabored.  GI: Abdomen soft and non-tender.  MSK:  No atrophy or tone abnormalities are noted.                 Neck: No tenderness to palpation over the cervical paraspinous muscles. No pain with neck flexion, extension, and lateral rotation.  No obvious deformity or signs of trauma.  Full  ROM.     Back: Tenderness to palpation over the bilateral lumbar paraspinous muscles. + pain with back extension and rotation. Decreased range of motion on flexion and extension.     Extremities:  Peripheral joint ROM is full and pain free without obvious instability or laxity in all four extremities. No edema or skin discolorations noted.      Gait:  Gait is antalgic, uses cane.     NEUR:  No loss of sensation is noted.     Strength testing:    Right hip flexion: 4+/5  Left hip flexion: 5/5  Right knee extension: 4+/5  Left knee extension: 5/5  Right knee flexion: 4+/5  Left knee flexion: 5/5  Right ankle dorsiflexion: 5/5  Left ankle dorsiflexion: 5/5    ASSESSMENT: 69 y/o male with chronic low back and leg pain, consistent with discogenic pain and radiculitis. Back pain also a component of facet arthropathy.    1. Lumbar spondylosis    2. DDD (degenerative disc disease), lumbar    3. Osteoarthritis of spine with radiculopathy, lumbar region    4. Lumbar foraminal stenosis        PLAN:     - I have stressed the importance of physical activity and a home exercise plan to help with pain and improve health.  - Schedule for Bilateral L4/5 and L5/S1 medial branch blocks. If diagnostic will plan for RFA. The patient understands the medial branch blocks are intended to help with his lower back pain and not the pain radiating down his legs.  - Return to clinic after procedure.      The above plan and management options were discussed at length with patient. Patient is in agreement with the above and verbalized understanding. It will be communicated with the referring physician via electronic record, fax, or mail.    Kamar Haile III  05/21/2019

## 2019-05-22 ENCOUNTER — TELEPHONE (OUTPATIENT)
Dept: PAIN MEDICINE | Facility: CLINIC | Age: 69
End: 2019-05-22

## 2019-05-22 NOTE — TELEPHONE ENCOUNTER
Spoke with patient's daughter Olga as patient is hard of hearing and often does not hear his phone ring. I informed her that the patient will be called 2-3 days prior to the procedure by pre op with his instructions and time to arrive the morning of the procedure. Verbalized understanding and will discuss this information with her father. No further issues discussed.

## 2019-05-22 NOTE — TELEPHONE ENCOUNTER
----- Message from Priti Sanchez LPN sent at 5/22/2019 11:25 AM CDT -----  Contact: self      ----- Message -----  From: Manju Velasquez  Sent: 5/22/2019  11:16 AM  To: Mic Galvin [facility] Staff    Type: Needs Medical Advice    Who Called:  self  Symptoms (please be specific):    How long has patient had these symptoms:    Pharmacy name and phone #:    Best Call Back Number: [phone]  Additional Information: Patient is calling regarding the time he needs to be at the hospital for the nerve block on 06/06/19. Please call patient. Thanks!

## 2019-06-06 PROBLEM — M47.816 LUMBAR SPONDYLOSIS: Status: ACTIVE | Noted: 2019-06-06

## 2019-06-10 ENCOUNTER — TELEPHONE (OUTPATIENT)
Dept: PAIN MEDICINE | Facility: CLINIC | Age: 69
End: 2019-06-10

## 2019-06-10 DIAGNOSIS — M47.816 LUMBAR SPONDYLOSIS: Primary | ICD-10-CM

## 2019-06-10 DIAGNOSIS — M51.36 DDD (DEGENERATIVE DISC DISEASE), LUMBAR: ICD-10-CM

## 2019-06-10 NOTE — TELEPHONE ENCOUNTER
----- Message from Oma Gaston sent at 6/10/2019  9:32 AM CDT -----  Type: Needs Medical Advice    Who Called:  patient  Symptoms (please be specific):  na  How long has patient had these symptoms:  deanne  Pharmacy name and phone #:  [facility]  Best Call Back Number: [phone]  Additional Information: had a procedure last week and is calling to speak with Nurse Tanvir gonsalves about how he is doing/please call

## 2019-06-10 NOTE — TELEPHONE ENCOUNTER
Spoke with the patient about scheduling the next MBB. Patient agreed to 6/20/19. Made him aware that he would be contacted by pre op with his instructions and time to arrive the morning of the procedure. Patient verbalized understanding of the information provided to him. No further issues discussed.

## 2019-06-10 NOTE — TELEPHONE ENCOUNTER
Spoke with patient about the results of the MBB. Patient stated he received 100% relief after the procedure and continues to have nearly the same relief today. Patient would like to proceed with scheduling the next MBB.

## 2019-06-24 ENCOUNTER — TELEPHONE (OUTPATIENT)
Dept: PAIN MEDICINE | Facility: CLINIC | Age: 69
End: 2019-06-24

## 2019-06-24 NOTE — TELEPHONE ENCOUNTER
----- Message from Yuliya Bangura sent at 6/24/2019 11:13 AM CDT -----  Contact: self  Patient requesting to speak to Tanvir     Please call to advice [phone] (home)

## 2019-06-25 NOTE — TELEPHONE ENCOUNTER
----- Message from Therese Boyce sent at 6/25/2019  3:51 PM CDT -----  Contact: patient  Type:  Patient Returning Call    Who Called:  patient  Who Left Message for Patient:  Tanvir  Does the patient know what this is regarding?:  yes  Best Call Back Number:  [phone]  Additional Information:  Requesting a call back

## 2019-06-25 NOTE — TELEPHONE ENCOUNTER
Spoke with patient about his having to make an appointment with new provider Dr Lugo to establish with him and discuss proceeding to the RFA. Appointment established with provider. No further issues discussed.

## 2019-07-11 ENCOUNTER — OFFICE VISIT (OUTPATIENT)
Dept: PAIN MEDICINE | Facility: CLINIC | Age: 69
End: 2019-07-11
Payer: MEDICARE

## 2019-07-11 VITALS
DIASTOLIC BLOOD PRESSURE: 68 MMHG | SYSTOLIC BLOOD PRESSURE: 102 MMHG | HEART RATE: 60 BPM | WEIGHT: 179.38 LBS | BODY MASS INDEX: 24.3 KG/M2 | HEIGHT: 72 IN

## 2019-07-11 DIAGNOSIS — M47.816 LUMBAR SPONDYLOSIS: ICD-10-CM

## 2019-07-11 DIAGNOSIS — M51.36 DDD (DEGENERATIVE DISC DISEASE), LUMBAR: Primary | ICD-10-CM

## 2019-07-11 PROCEDURE — 99999 PR PBB SHADOW E&M-EST. PATIENT-LVL III: CPT | Mod: PBBFAC,,, | Performed by: PAIN MEDICINE

## 2019-07-11 PROCEDURE — 1101F PT FALLS ASSESS-DOCD LE1/YR: CPT | Mod: CPTII,S$GLB,, | Performed by: PAIN MEDICINE

## 2019-07-11 PROCEDURE — 99214 PR OFFICE/OUTPT VISIT, EST, LEVL IV, 30-39 MIN: ICD-10-PCS | Mod: S$GLB,,, | Performed by: PAIN MEDICINE

## 2019-07-11 PROCEDURE — 99214 OFFICE O/P EST MOD 30 MIN: CPT | Mod: S$GLB,,, | Performed by: PAIN MEDICINE

## 2019-07-11 PROCEDURE — 99999 PR PBB SHADOW E&M-EST. PATIENT-LVL III: ICD-10-PCS | Mod: PBBFAC,,, | Performed by: PAIN MEDICINE

## 2019-07-11 PROCEDURE — 1101F PR PT FALLS ASSESS DOC 0-1 FALLS W/OUT INJ PAST YR: ICD-10-PCS | Mod: CPTII,S$GLB,, | Performed by: PAIN MEDICINE

## 2019-07-11 NOTE — PROGRESS NOTES
Subjective:     Patient ID: Hany Esposito is a 68 y.o. male    Chief Complaint: Low-back Pain (f/u 2nd MBB, discuss RFA)      Referred by: No ref. provider found      HPI:    Initial Encounter (7/11/19):  Hany Esposito is a 68 y.o. male who presents today with chronic bilateral low back pain. Patient has been previously treated by Dr. Haile as undergone bilateral L3, L4 and L5 medial branch blocks x2 with near complete relief of his pain. Patient is here today to discuss proceeding with radiofrequency ablations.  Patient denies any changes in the quality location was pain. Patient denies any new or worsening symptoms.  Patient interested in proceeding with radiofrequency ablations.    Physical Therapy:  Yes.    Non-pharmacologic Treatment:  Rest helps         · TENS?  No    Pain Medications:         · Currently taking:  Gabapentin, Norco, meloxicam    · Has tried in the past:      · Has not tried:  Muscle relaxants, TCAs, SNRIs, topical creams    Blood thinners:  None    Interventional Therapies:   4/19/18 - right L4 and L5 transforaminal epidural steroid injection  6/1/18 - C7-T1 interlaminar epidural steroid injection  2/11/19 - L5-S1 interlaminar epidural steroid injection  6/6/19 - bilateral L3, L4 and L5 medial branch blocks  6/20/19 - bilateral L3, L4 and L5 medial branch blocks    Relevant Surgeries:  None    Affecting sleep?  Yes    Affecting daily activities? yes    Depressive symptoms? no          · SI/HI? No    Work status: Retired    Pain Scores:    Best:       3/10  Worst:     9/10  Usually:   6/10  Today:    5/10    Review of Systems   Constitutional: Negative for activity change, appetite change, chills, fatigue, fever and unexpected weight change.   HENT: Negative for hearing loss.    Eyes: Negative for visual disturbance.   Respiratory: Negative for chest tightness and shortness of breath.    Cardiovascular: Negative for chest pain.   Gastrointestinal: Negative for abdominal pain,  constipation, diarrhea, nausea and vomiting.   Genitourinary: Negative for difficulty urinating.   Musculoskeletal: Positive for back pain, gait problem, myalgias, neck pain and neck stiffness.   Skin: Negative for rash.   Neurological: Positive for weakness and numbness. Negative for dizziness, light-headedness and headaches.   Psychiatric/Behavioral: Positive for sleep disturbance. Negative for hallucinations and suicidal ideas. The patient is not nervous/anxious.        Past Medical History:   Diagnosis Date    Back pain     CAD (coronary artery disease)     Cervical radiculopathy     DDD (degenerative disc disease), cervical 2018    DDD (degenerative disc disease), lumbar 2018    Depression     Hyperlipidemia     Lumbar foraminal stenosis     Lumbar spondylolysis     Myocardial infarction 2016    2 heart stents       Past Surgical History:   Procedure Laterality Date    Block-nerve-medial branch-lumbar---bilalteral L4/5, L5/S1 Bilateral 6/6/2019    Per[facility], [facility] at [facility] OR    Block-nerve-medial branch-lumbar---Bilateral L4/5, L5/S1 Bilateral 6/20/2019    [facility] at [facility] OR    CHOLECYSTECTOMY      CORONARY STENT PLACEMENT      Nov 11    HERNIA REPAIR      Injection-steroid-epidural-lumbar N/A 2/11/2019    [facility]    INJECTION-STEROID-EPIDURAL-TRANSFORAMINAL L4 & L5 Right 4/19/2018    Performed by [facility], [facility] at [facility] CATH LAB    Lumbar medial branch nerve block Bilateral 06/06/2019    L4/5, L5/S1    SPINE SURGERY  1978 and 1982    x2 no hardware       Social History     Socioeconomic History    Marital status:      Spouse name: Not on file    Number of children: 2    Years of education: Not on file    Highest education level: Not on file   Occupational History    Not on file   Social Needs    Financial resource strain: Not on file    Food insecurity:     Worry: Not  on file     Inability: Not on file    Transportation needs:     Medical: Not on file     Non-medical: Not on file   Tobacco Use    Smoking status: Current Every Day Smoker     Packs/day: 0.25     Types: Cigarettes    Smokeless tobacco: Never Used   Substance and Sexual Activity    Alcohol use: No    Drug use: No    Sexual activity: Not on file   Lifestyle    Physical activity:     Days per week: Not on file     Minutes per session: Not on file    Stress: Not on file   Relationships    Social connections:     Talks on phone: Not on file     Gets together: Not on file     Attends [employer] service: Not on file     Active member of club or organization: Not on file     Attends meetings of clubs or organizations: Not on file     Relationship status: Not on file   Other Topics Concern    Not on file   Social History Narrative    [employer]        Review of patient's allergies indicates:  No Known Allergies    Current Outpatient Medications on File Prior to Visit   Medication Sig Dispense Refill    atorvastatin (LIPITOR) 40 MG tablet Take 40 mg by mouth once daily.      clonazePAM (KLONOPIN) 1 MG tablet Take 1 mg by mouth 2 (two) times daily as needed for Anxiety.      diclofenac sodium 1 % Gel Apply 4 g topically 4 (four) times daily as needed.      escitalopram oxalate (LEXAPRO) 10 MG tablet Take 10 mg by mouth once daily.      gabapentin (NEURONTIN) 300 MG capsule Take 300 mg by mouth 3 (three) times daily.      hydrocodone-acetaminophen 10-325mg (NORCO)  mg Tab Take 1 tablet by mouth every 6 to 8 hours as needed for Pain.      meloxicam (MOBIC) 15 MG tablet Take 15 mg by mouth once daily.       No current facility-administered medications on file prior to visit.        Objective:      /68 (BP Location: Left arm, Patient Position: Sitting, BP Method: Medium (Automatic))   Pulse 60   Ht 6' (1.829 m)   Wt 81.4 kg (179 lb 5.5 oz)   BMI 24.32 kg/m²     Exam:  GEN:  Well developed, well  nourished.  No acute distress.   HEENT:  No trauma.  Mucous membranes moist.  Nares patent bilaterally.  PSYCH: Normal affect. Thought content appropriate.  CHEST:  Breathing symmetric.  No audible wheezing.  ABD: Soft, non-distended.  SKIN:  Warm, pink, dry.  No rash on exposed areas.    EXT:  No cyanosis, clubbing, or edema.  No color change or changes in nail or hair growth.  NEURO/MUSCULOSKELETAL:  Fully alert, oriented, and appropriate. Speech normal johnnie. No cranial nerve deficits.   Gait:  Antalgic.  No focal motor deficits.       Imaging:  CT LUMBAR SPINE WITHOUT CONTRAST (3/11/2019):        FINDINGS:  Chronic moderate superior endplate compression fracture deformity of the L1 vertebral body noted unchanged as compared to prior lumbar spine series 03/29/2018.  No associated compromise of the central spinal canal.    Remaining lumbar vertebral bodies as well as T12 are normal in height.    Minimal anterolisthesis L4 on L5 noted and appears related to severe facet arthropathy at this level.  Alignment is otherwise anatomic.  Degenerative moderately severe loss of height of the L5-S1 disc noted, remaining lumbar discs appear fairly well maintained in height.    L4-5, severe bilateral facet arthropathy is present including hypertrophy of the facet joints with vacuum facets.  Associated very mild anterolisthesis L4 on L5 again noted with mild unroofing of the posterior disc margin and perhaps mild annular bulging.  Calcification of the disc noted.  Mild bilateral foraminal narrowing is present.    L5-S1, moderately severe degenerative loss of height of the disc is present with suspected mild underlying bulging of the disc and with superimposed right posterior osteophyte resulting in partial effacement of the right lateral recess and appears to contact the proximal right S1 root sleeve.  Left posterolateral spondylosis is also present with moderate narrowing of the left foramen.    No disc herniation, canal  compromise foraminal compromise appreciated remaining lumbar or visualized lower thoracic disc levels.         Cervical MRI (4/12/2018):     FINDINGS:  Alignment: There is an accentuated cervical lordosis.    Vertebrae: Normal marrow signal. No fracture.    Discs: There is diffuse disc desiccation with narrowing    Cord: Normal.    Skull base and craniocervical junction: Normal.    Degenerative findings:    C2-C3: There is disc desiccation.  There is no evidence of herniation, spinal canal stenosis.  The neural foramen or normal.    C3-C4: There is disc desiccation.  There is a small central uncovertebral bony spur which encroaches upon the anterior thecal sac.  There is normal spinal canal stenosis.  Normal foramina.    C4-C5: The disc signal is abnormal with desiccation.  There is a circumferential bulging disc which encroaches upon the anterior thecal sac.  There is no spinal cord compression.  There is mild facet hypertrophy.  Slight narrowing is noted of the left neural foramen.    C5-C6: There is evidence of disc desiccation.  There is a circumferential bulging disc which encroaches upon the anterior thecal sac. There is no spinal cord compression. There is no spinal canal stenosis.  The foramen are normal.  Mild facet hypertrophy is noted.    C6-C7: There is disc desiccation.  A small uncovertebral bony spur encroaches upon the anterior thecal sac.  There is no spinal cord compression.  No spinal canal stenosis.  The foramina are normal.    C7-T1: The disc signal is normal.  There is now evidence of herniation or spinal canal stenosis.  Normal foramina.    Paraspinal muscles & soft tissues: Unremarkable.     Lumbar MRI (10/17/2017):     1) Stable moderate compression deformity involving the superior endplate of L1 with no new or subacute compression deformities.  2) Mild to moderate facet arthropathy L3-4 without significant foraminal restriction.  3) Moderate lumbar stenosis L4-5 predominately from  hypertrophic facet arthopathy. There is moderate bilateral foraminal restriction with mild contact of the exiting nerve root on th left foramen. There is also mild contact of the traversing nerve root on the left. The findings appear more eccentric when compared to the prior study of 5/29/2015.  4) Prominent discogenic disease L5-S1 with prominent facet arthropathy. There is moderate foraminal restriction bilaterally without root contact. These findings appear generally stable when compared to the prior study.      Cervical MRI (5/29/2015):      1) Exaggerated cervical lordosis is noted which is within normal limits without acute cervical vertebral body fracture, compression fracture deformity or spondylolisthesis. Low-grade discogenic disease is present mainly at C4-5, C5-6 and C6-7 with low-grade uncovertebral joint hypertrophy at C5-6, and C6-7. No central canal stenosis is present.  2) Mild bilateral neural foraminal narrowing is present at C5-6 and C6-7 with minimal bilateral neural foraminal narrowing at C3-4.      Assessment:       Encounter Diagnoses   Name Primary?    DDD (degenerative disc disease), lumbar Yes    Lumbar spondylosis          Plan:       Hany was seen today for low-back pain.    Diagnoses and all orders for this visit:    DDD (degenerative disc disease), lumbar    Lumbar spondylosis        Hany Esposito is a 68 y.o. male with chronic bilateral low back pain secondary to lumbar facet pain..    1.  Pertinent imaging studies reviewed by me. Imaging results were discussed with patient.  2.  Schedule for left-sided L3, L4 and L5 radiofrequency ablation.  2 weeks later can proceed with right-sided.  3.  Return to clinic 2 weeks after procedure discuss results.

## 2019-07-19 DIAGNOSIS — M47.816 LUMBAR SPONDYLOSIS: Primary | ICD-10-CM

## 2019-07-26 ENCOUNTER — TELEPHONE (OUTPATIENT)
Dept: PAIN MEDICINE | Facility: CLINIC | Age: 69
End: 2019-07-26

## 2019-07-26 NOTE — TELEPHONE ENCOUNTER
Spoke with the patient's daughter, Erika, to inform her that the procedure date is set for 8/1/19. The pre op and surgical team will be in contact with him discuss the instructions and give him the time to arrive the morning of the procedure. Daughter verbalized understanding of the information provided to her. No further issues discussed.

## 2019-07-26 NOTE — TELEPHONE ENCOUNTER
----- Message from Napoleon Lugo Jr., MD sent at 7/26/2019  8:27 AM CDT -----  Suresh Bender Staff  Caller:  pt (Yesterday,  4:04 PM)         The Pt is requesting a call back regarding clarification on the procedure date and time. Please call to advise.     Call Back#: [phone]   Thanks

## 2019-07-29 ENCOUNTER — TELEPHONE (OUTPATIENT)
Dept: PAIN MEDICINE | Facility: CLINIC | Age: 69
End: 2019-07-29

## 2019-07-29 NOTE — TELEPHONE ENCOUNTER
Spoke with patient. Stated he did receive over 80% relief after the second MBB for greater than 48 hours then the pain began to return. No further issues were discussed.

## 2019-07-31 ENCOUNTER — TELEPHONE (OUTPATIENT)
Dept: PAIN MEDICINE | Facility: CLINIC | Age: 69
End: 2019-07-31

## 2019-07-31 NOTE — TELEPHONE ENCOUNTER
Spoke with patient about his procedure tomorrow. Informed him that the procedure had not been approved by the insurance provider yet and would have to be postponed 1 week to 8/8/19. Patient verbalized understanding of the information provided to him. No further issues were discussed.

## 2020-03-09 ENCOUNTER — TELEPHONE (OUTPATIENT)
Dept: ORTHOPEDICS | Facility: CLINIC | Age: 70
End: 2020-03-09

## 2020-03-09 DIAGNOSIS — M79.641 RIGHT HAND PAIN: Primary | ICD-10-CM

## 2020-03-10 ENCOUNTER — TELEPHONE (OUTPATIENT)
Dept: ORTHOPEDICS | Facility: CLINIC | Age: 70
End: 2020-03-10

## 2020-03-10 NOTE — TELEPHONE ENCOUNTER
Left a voicemail for patient to return my phone call in regards to upcoming appointment/x-ray at their convenience. Provided the clinic's phone number. Xray ordered and scheduled.

## 2020-03-11 ENCOUNTER — HOSPITAL ENCOUNTER (OUTPATIENT)
Dept: RADIOLOGY | Facility: OTHER | Age: 70
Discharge: HOME | End: 2020-03-11
Attending: PLASTIC SURGERY
Payer: COMMERCIAL

## 2020-03-11 ENCOUNTER — OFFICE VISIT (OUTPATIENT)
Dept: ORTHOPEDICS | Facility: CLINIC | Age: 70
End: 2020-03-11
Payer: COMMERCIAL

## 2020-03-11 VITALS
BODY MASS INDEX: 24.38 KG/M2 | HEART RATE: 72 BPM | DIASTOLIC BLOOD PRESSURE: 76 MMHG | HEIGHT: 72 IN | SYSTOLIC BLOOD PRESSURE: 102 MMHG | WEIGHT: 180 LBS

## 2020-03-11 DIAGNOSIS — M67.431 GANGLION CYST OF DORSUM OF RIGHT WRIST: Primary | ICD-10-CM

## 2020-03-11 DIAGNOSIS — M79.641 RIGHT HAND PAIN: ICD-10-CM

## 2020-03-11 PROCEDURE — 73110 XR WRIST COMPLETE 3 VIEWS RIGHT: ICD-10-PCS | Mod: 26,RT,, | Performed by: RADIOLOGY

## 2020-03-11 PROCEDURE — 73110 X-RAY EXAM OF WRIST: CPT | Mod: 26,RT,, | Performed by: RADIOLOGY

## 2020-03-11 PROCEDURE — 99999 PR PBB SHADOW E&M-EST. PATIENT-LVL IV: ICD-10-PCS | Mod: PBBFAC,,, | Performed by: PLASTIC SURGERY

## 2020-03-11 PROCEDURE — 99203 OFFICE O/P NEW LOW 30 MIN: CPT | Mod: S$PBB,,, | Performed by: PLASTIC SURGERY

## 2020-03-11 PROCEDURE — 99999 PR PBB SHADOW E&M-EST. PATIENT-LVL IV: CPT | Mod: PBBFAC,,, | Performed by: PLASTIC SURGERY

## 2020-03-11 PROCEDURE — 99203 PR OFFICE/OUTPT VISIT, NEW, LEVL III, 30-44 MIN: ICD-10-PCS | Mod: S$PBB,,, | Performed by: PLASTIC SURGERY

## 2020-03-11 PROCEDURE — 99214 OFFICE O/P EST MOD 30 MIN: CPT | Mod: PBBFAC,25 | Performed by: PLASTIC SURGERY

## 2020-03-11 PROCEDURE — 73110 X-RAY EXAM OF WRIST: CPT | Mod: TC,FY,RT

## 2020-03-11 RX ORDER — SODIUM CHLORIDE 9 MG/ML
INJECTION, SOLUTION INTRAVENOUS CONTINUOUS
Status: CANCELLED | OUTPATIENT
Start: 2020-03-11

## 2020-03-11 NOTE — PROGRESS NOTES
Chief Complaint: Pain of the Right Wrist      HPI:    Hany Esposito is a left hand dominant 69 y.o. male presenting today for right dorsal ganglion cyst of the wrist.  He states that over last 3 years he has had a growing cyst over the dorsal aspect of his right wrist.  He now states that he experiences a shooting shocking sensation down into the dorsal aspect of his thumb and index finger.  He also states that 1 of the cyst mobile with the movement of the thumb and causes discomfort.  He would like to discuss treatment options.  He denies any previous a recent history of trauma.  No numbness or tingling no other complaints today.    Past Medical History:   Diagnosis Date    Back pain     CAD (coronary artery disease)     Cervical radiculopathy     DDD (degenerative disc disease), cervical 2018    DDD (degenerative disc disease), lumbar 2018    Depression     Hyperlipidemia     Lumbar foraminal stenosis     Lumbar spondylolysis     Myocardial infarction 2016    2 heart stents       Past Surgical History:   Procedure Laterality Date    CHOLECYSTECTOMY      CORONARY STENT PLACEMENT      Nov 11    EPIDURAL STEROID INJECTION INTO LUMBAR SPINE N/A 2/11/2019    Procedure: Injection-steroid-epidural-lumbar;  Surgeon: Jose Varghese MD;  Location: [facility] OR;  Service: Pain Management;  Laterality: N/A;  L5-S1    HERNIA REPAIR      INJECTION OF ANESTHETIC AGENT AROUND MEDIAL BRANCH NERVES INNERVATING LUMBAR FACET JOINT Bilateral 6/6/2019    Procedure: Block-nerve-medial branch-lumbar---bilalteral L4/5, L5/S1;  Surgeon: Kamar Haile III, MD;  Location: [facility] OR;  Service: Pain Management;  Laterality: Bilateral;    INJECTION OF ANESTHETIC AGENT AROUND MEDIAL BRANCH NERVES INNERVATING LUMBAR FACET JOINT Bilateral 6/20/2019    Procedure: Block-nerve-medial branch-lumbar---Bilateral L4/5, L5/S1;  Surgeon: Kamar Haile III, MD;  Location: [facility] OR;  Service: Pain Management;  Laterality: Bilateral;     Lumbar medial branch nerve block Bilateral 06/06/2019    L4/5, L5/S1    RADIOFREQUENCY ABLATION Right 8/8/2019    Procedure: Radiofrequency Ablation---Right lumbar L3, L4, L5;  Surgeon: Napoleon Lugo Jr., MD;  Location: [facility] OR;  Service: Pain Management;  Laterality: Right;    RADIOFREQUENCY ABLATION Left 8/22/2019    Procedure: Radiofrequency Ablation---Left L3, L4, L5;  Surgeon: Napoleon Lugo Jr., MD;  Location: [facility] OR;  Service: Pain Management;  Laterality: Left;    SPINE SURGERY  1978 and 1982    x2 no hardware        Family History   Problem Relation Age of Onset    Cancer Mother     Lung cancer Mother     Brain cancer Mother     Clotting disorder Father     Heart disease Father     Heart disease Brother     Heart disease Brother     Alcohol abuse Brother     Alcohol abuse Brother        Social History     Socioeconomic History    Marital status:      Spouse name: Not on file    Number of children: 2    Years of education: Not on file    Highest education level: Not on file   Occupational History    Not on file   Social Needs    Financial resource strain: Not on file    Food insecurity:     Worry: Not on file     Inability: Not on file    Transportation needs:     Medical: Not on file     Non-medical: Not on file   Tobacco Use    Smoking status: Current Every Day Smoker     Packs/day: 0.25     Types: Cigarettes    Smokeless tobacco: Never Used   Substance and Sexual Activity    Alcohol use: No    Drug use: No    Sexual activity: Not on file   Lifestyle    Physical activity:     Days per week: Not on file     Minutes per session: Not on file    Stress: Not on file   Relationships    Social connections:     Talks on phone: Not on file     Gets together: Not on file     Attends Taoism service: Not on file     Active member of club or organization: Not on file     Attends meetings of clubs or organizations: Not on file     Relationship status: Not on file    Other Topics Concern    Not on file   Social History Narrative    [employer]        Review of patient's allergies indicates:  No Known Allergies      Current Outpatient Medications:     atorvastatin (LIPITOR) 40 MG tablet, Take 40 mg by mouth once daily., Disp: , Rfl:     clonazePAM (KLONOPIN) 1 MG tablet, Take 1 mg by mouth 2 (two) times daily as needed for Anxiety., Disp: , Rfl:     diclofenac sodium 1 % Gel, Apply 4 g topically 4 (four) times daily as needed., Disp: , Rfl:     escitalopram oxalate (LEXAPRO) 10 MG tablet, Take 10 mg by mouth once daily., Disp: , Rfl:     gabapentin (NEURONTIN) 300 MG capsule, Take 300 mg by mouth 3 (three) times daily., Disp: , Rfl:     hydrocodone-acetaminophen 10-325mg (NORCO)  mg Tab, Take 1 tablet by mouth every 6 to 8 hours as needed for Pain., Disp: , Rfl:     meloxicam (MOBIC) 15 MG tablet, Take 15 mg by mouth once daily., Disp: , Rfl:         Review of Systems:  Constitutional: no fever or chills  ENT: no nasal congestion or sore throat  Respiratory: no cough or shortness of breath  Cardiovascular: no chest pain or palpitations  Gastrointestinal: no nausea or vomiting, PUD, GERD, NSAID intolerance  Genitourinary: no hematuria or dysuria  Integument/Breast: no rash or pruritis  Hematologic/Lymphatic: no easy bruising or lymphadenopathy  Musculoskeletal: see HPI  Neurological: no seizures or tremors  Behavioral/Psych: no auditory or visual hallucinations      Objective:      PHYSICAL EXAM:  Vitals:    03/11/20 0851   BP: 102/76   Pulse: 72   Weight: 81.6 kg (180 lb)   Height: 6' (1.829 m)   PainSc:   2     General Appearance: WDWN, NAD  Neuro/Psych: Mood & affect appropriate  Lungs: Respirations equal and unlabored.   CV: No apparent CV dysfunction, distal pulses intact, RRR  Skin: Intact throughout  Extremities:   Right Hand Exam     Tenderness   The patient is experiencing tenderness in the dorsal area (Tenderness to compression over the dorsal aspect of  the right wrist ganglion cyst).    Range of Motion   The patient has normal right wrist ROM.   Hand   MP Thumb: normal   DIP Thumb: normal     Other   Erythema: absent  Sensation: normal (Pain within the superficial radial nerve distribution of the hand with compression of the cyst)  Pulse: present    Comments:  There is a cyst associated with the EPL tendon that passes within the extensor retinacular ligament with extension and flexion of the thumb, two mildly compressible ganglion cyst with dorsal aspect the wrist 1 associated with the radiocarpal joint and extension surrounding the EPL tendon              DIAGNOSTICS/IMAGING:    Radiologist's Impression:     EXAMINATION:  XR WRIST COMPLETE 3 VIEWS RIGHT    CLINICAL HISTORY:  Pain in right hand    TECHNIQUE:  PA, lateral, and oblique views of the right wrist were performed.    COMPARISON:  None    FINDINGS:  No evidence for acute fracture or dislocation right wrist.  No focal bony erosion.  Articular spaces are relatively maintained.  Further evaluation as warranted clinically.    Comments: I have personnaly reviewed the imaging and I agree with the above radiologist's report.          Assessment:     Encounter Diagnosis   Name Primary?    Ganglion cyst of dorsum of right wrist Yes          Plan/Discussion:   Hany was seen today for pain.    Diagnoses and all orders for this visit:    Ganglion cyst of dorsum of right wrist  -     Vital signs; Standing  -     Cleanse with Chlorhexidine (CHG); Standing  -     Diet NPO; Standing  -     Place PAULINA hose; Standing  -     Place sequential compression device; Standing  -     Case Request Operating Room: EXCISION, GANGLION CYST, WRIST right  -     Full code; Standing  -     Place in Outpatient; Standing    Other orders  -     0.9%  NaCl infusion  -     IP VTE LOW RISK PATIENT; Standing  -     ceFAZolin (ANCEF) 2 g in dextrose 5 % 50 mL IVPB     Based on today's findings it appears that the patient has a dorsal ganglion  cyst arising from the radiocarpal joint encasing the EPL tendon of the right wrist.  He is also experiencing symptoms within the superficial radial nerve distribution of the right hand this is likely due to impingement of the cyst onto the nerve.  I discussed that surgical excision of the cyst would be the best option to remove the cyst and improve his nerve pain symptoms.  I discussed the risks benefits alternatives in detail and he wished to proceed we discussed possibility of nerve injury or continued pain and cyst recurrence and he wished to proceed informed consent was obtained the patient was then scheduled for the procedure.

## 2020-07-20 ENCOUNTER — TELEPHONE (OUTPATIENT)
Dept: ORTHOPEDICS | Facility: CLINIC | Age: 70
End: 2020-07-20

## 2020-08-07 ENCOUNTER — OFFICE VISIT (OUTPATIENT)
Dept: ORTHOPEDICS | Facility: CLINIC | Age: 70
End: 2020-08-07
Payer: COMMERCIAL

## 2020-08-07 ENCOUNTER — HOSPITAL ENCOUNTER (OUTPATIENT)
Dept: RADIOLOGY | Facility: OTHER | Age: 70
Discharge: HOME | End: 2020-08-07
Attending: PLASTIC SURGERY
Payer: COMMERCIAL

## 2020-08-07 VITALS
SYSTOLIC BLOOD PRESSURE: 105 MMHG | HEIGHT: 72 IN | BODY MASS INDEX: 24.38 KG/M2 | WEIGHT: 180 LBS | DIASTOLIC BLOOD PRESSURE: 67 MMHG | HEART RATE: 74 BPM

## 2020-08-07 DIAGNOSIS — M67.431 GANGLION CYST OF DORSUM OF RIGHT WRIST: ICD-10-CM

## 2020-08-07 DIAGNOSIS — R22.31 ELBOW MASS, RIGHT: Primary | ICD-10-CM

## 2020-08-07 DIAGNOSIS — R22.31 ELBOW MASS, RIGHT: ICD-10-CM

## 2020-08-07 DIAGNOSIS — Z01.818 PREOPERATIVE CLEARANCE: ICD-10-CM

## 2020-08-07 PROCEDURE — 73080 X-RAY EXAM OF ELBOW: CPT | Mod: TC,FY,RT

## 2020-08-07 PROCEDURE — 99213 OFFICE O/P EST LOW 20 MIN: CPT | Mod: S$PBB,,, | Performed by: PLASTIC SURGERY

## 2020-08-07 PROCEDURE — 99214 OFFICE O/P EST MOD 30 MIN: CPT | Mod: PBBFAC,25 | Performed by: PLASTIC SURGERY

## 2020-08-07 PROCEDURE — 99213 PR OFFICE/OUTPT VISIT, EST, LEVL III, 20-29 MIN: ICD-10-PCS | Mod: S$PBB,,, | Performed by: PLASTIC SURGERY

## 2020-08-07 PROCEDURE — 73080 XR ELBOW COMPLETE 3 VIEW RIGHT: ICD-10-PCS | Mod: 26,RT,, | Performed by: RADIOLOGY

## 2020-08-07 PROCEDURE — 73080 X-RAY EXAM OF ELBOW: CPT | Mod: 26,RT,, | Performed by: RADIOLOGY

## 2020-08-07 PROCEDURE — 99999 PR PBB SHADOW E&M-EST. PATIENT-LVL IV: CPT | Mod: PBBFAC,,, | Performed by: PLASTIC SURGERY

## 2020-08-07 PROCEDURE — 99999 PR PBB SHADOW E&M-EST. PATIENT-LVL IV: ICD-10-PCS | Mod: PBBFAC,,, | Performed by: PLASTIC SURGERY

## 2020-08-07 RX ORDER — SODIUM CHLORIDE 9 MG/ML
INJECTION, SOLUTION INTRAVENOUS CONTINUOUS
Status: CANCELLED | OUTPATIENT
Start: 2020-08-07

## 2020-08-07 NOTE — PROGRESS NOTES
Chief Complaint: Pain of the Right Wrist      HPI:    Hany Esposito is a left hand dominant 69 y.o. male presenting today to reschedule his right dorsal ganglion cyst of the wrist excision surgery.  He continues to have pain with movement of the thumb associated with the cyst catching over the dorsal aspect of the wrist.  He also complains of a slow growing mass over the right elbow.  He states that it started as a small pea several years ago and has slowly increased in size.  He experiences pain when striking the elbow on hard objects.  He expresses interest in having this removed.  He denies any other complaints today      Past Medical History:   Diagnosis Date    Back pain     CAD (coronary artery disease)     Cervical radiculopathy     DDD (degenerative disc disease), cervical 2018    DDD (degenerative disc disease), lumbar 2018    Depression     Hyperlipidemia     Lumbar foraminal stenosis     Lumbar spondylolysis     Myocardial infarction 2016    2 heart stents       Past Surgical History:   Procedure Laterality Date    CHOLECYSTECTOMY      CORONARY STENT PLACEMENT      Nov 11    EPIDURAL STEROID INJECTION INTO LUMBAR SPINE N/A 2/11/2019    Procedure: Injection-steroid-epidural-lumbar;  Surgeon: Jose Varghese MD;  Location: [facility] OR;  Service: Pain Management;  Laterality: N/A;  L5-S1    HERNIA REPAIR      INJECTION OF ANESTHETIC AGENT AROUND MEDIAL BRANCH NERVES INNERVATING LUMBAR FACET JOINT Bilateral 6/6/2019    Procedure: Block-nerve-medial branch-lumbar---bilalteral L4/5, L5/S1;  Surgeon: Kamar Haile III, MD;  Location: [facility] OR;  Service: Pain Management;  Laterality: Bilateral;    INJECTION OF ANESTHETIC AGENT AROUND MEDIAL BRANCH NERVES INNERVATING LUMBAR FACET JOINT Bilateral 6/20/2019    Procedure: Block-nerve-medial branch-lumbar---Bilateral L4/5, L5/S1;  Surgeon: Kamar Haile III, MD;  Location: [facility] OR;  Service: Pain Management;  Laterality: Bilateral;    Lumbar  medial branch nerve block Bilateral 06/06/2019    L4/5, L5/S1    RADIOFREQUENCY ABLATION Right 8/8/2019    Procedure: Radiofrequency Ablation---Right lumbar L3, L4, L5;  Surgeon: Napoleon Lugo Jr., MD;  Location: [facility] OR;  Service: Pain Management;  Laterality: Right;    RADIOFREQUENCY ABLATION Left 8/22/2019    Procedure: Radiofrequency Ablation---Left L3, L4, L5;  Surgeon: Napoleon Lugo Jr., MD;  Location: [facility] OR;  Service: Pain Management;  Laterality: Left;    SPINE SURGERY  1978 and 1982    x2 no hardware        Family History   Problem Relation Age of Onset    Cancer Mother     Lung cancer Mother     Brain cancer Mother     Clotting disorder Father     Heart disease Father     Heart disease Brother     Heart disease Brother     Alcohol abuse Brother     Alcohol abuse Brother        Social History     Socioeconomic History    Marital status:      Spouse name: Not on file    Number of children: 2    Years of education: Not on file    Highest education level: Not on file   Occupational History    Not on file   Social Needs    Financial resource strain: Not on file    Food insecurity     Worry: Not on file     Inability: Not on file    Transportation needs     Medical: Not on file     Non-medical: Not on file   Tobacco Use    Smoking status: Current Every Day Smoker     Packs/day: 0.25     Types: Cigarettes    Smokeless tobacco: Never Used   Substance and Sexual Activity    Alcohol use: No    Drug use: No    Sexual activity: Not on file   Lifestyle    Physical activity     Days per week: Not on file     Minutes per session: Not on file    Stress: Not on file   Relationships    Social connections     Talks on phone: Not on file     Gets together: Not on file     Attends Confucianist service: Not on file     Active member of club or organization: Not on file     Attends meetings of clubs or organizations: Not on file     Relationship status: Not on file   Other Topics  Concern    Not on file   Social History Narrative    [employer]        Review of patient's allergies indicates:  No Known Allergies      Current Outpatient Medications:     atorvastatin (LIPITOR) 40 MG tablet, Take 40 mg by mouth once daily., Disp: , Rfl:     clonazePAM (KLONOPIN) 1 MG tablet, Take 1 mg by mouth 2 (two) times daily as needed for Anxiety., Disp: , Rfl:     diclofenac sodium 1 % Gel, Apply 4 g topically 4 (four) times daily as needed., Disp: , Rfl:     escitalopram oxalate (LEXAPRO) 10 MG tablet, Take 10 mg by mouth once daily., Disp: , Rfl:     gabapentin (NEURONTIN) 300 MG capsule, Take 300 mg by mouth 3 (three) times daily., Disp: , Rfl:     hydrocodone-acetaminophen 10-325mg (NORCO)  mg Tab, Take 1 tablet by mouth every 6 to 8 hours as needed for Pain., Disp: , Rfl:     meloxicam (MOBIC) 15 MG tablet, Take 15 mg by mouth once daily., Disp: , Rfl:         Review of Systems:  Constitutional: no fever or chills  ENT: no nasal congestion or sore throat  Respiratory: no cough or shortness of breath  Cardiovascular: no chest pain or palpitations  Gastrointestinal: no nausea or vomiting, PUD, GERD, NSAID intolerance  Genitourinary: no hematuria or dysuria  Integument/Breast: no rash or pruritis  Hematologic/Lymphatic: no easy bruising or lymphadenopathy  Musculoskeletal: see HPI  Neurological: no seizures or tremors  Behavioral/Psych: no auditory or visual hallucinations      Objective:      PHYSICAL EXAM:  Vitals:    08/07/20 0901   BP: 105/67   Pulse: 74   Weight: 81.6 kg (180 lb)   Height: 6' (1.829 m)   PainSc:   6     General Appearance: WDWN, NAD  Neuro/Psych: Mood & affect appropriate  Lungs: Respirations equal and unlabored.   CV: No apparent CV dysfunction, distal pulses intact, RRR  Skin: Intact throughout  Extremities:   Right Hand Exam     Tenderness   The patient is experiencing tenderness in the dorsal area (Tenderness to compression over the dorsal aspect of the right wrist  ganglion cyst).    Range of Motion   The patient has normal right wrist ROM.   Hand   MP Thumb: normal   DIP Thumb: normal     Other   Erythema: absent  Sensation: normal (Pain within the superficial radial nerve distribution of the hand with compression of the cyst)  Pulse: present    Comments:  There is a cyst associated with the EPL tendon that passes within the extensor retinacular ligament with extension and flexion of the thumb, two mildly compressible ganglion cyst with dorsal aspect the wrist 1 associated with the radiocarpal joint and extension surrounding the EPL tendon      Right Elbow Exam     Range of Motion   The patient has normal right elbow ROM.    Comments:  2 cm by 2 cm subcutaneous mass over the posterior elbow at the olecranon it is mobile mildly tender              DIAGNOSTICS/IMAGING:    Radiologist's Impression:     EXAMINATION:  XR WRIST COMPLETE 3 VIEWS RIGHT    CLINICAL HISTORY:  Pain in right hand    TECHNIQUE:  PA, lateral, and oblique views of the right wrist were performed.    COMPARISON:  None    FINDINGS:  No evidence for acute fracture or dislocation right wrist.  No focal bony erosion.  Articular spaces are relatively maintained.  Further evaluation as warranted clinically.    Comments: I have personnaly reviewed the imaging and I agree with the above radiologist's report.          Assessment:     Encounter Diagnoses   Name Primary?    Elbow mass, right Yes    Ganglion cyst of dorsum of right wrist           Plan/Discussion:   Hany was seen today for pain.    Diagnoses and all orders for this visit:    Elbow mass, right  -     X-Ray Elbow Complete Right; Future  -     Vital signs; Standing  -     Cleanse with Chlorhexidine (CHG); Standing  -     Diet NPO; Standing  -     Place PAULINA hose; Standing  -     Place sequential compression device; Standing  -     Case Request Operating Room: EXCISION, LESION, TENDON SHEATH, HAND, EXCISION, MASS, EXTREMITY right elbow  -     Full code;  Standing  -     Place in Outpatient; Standing    Ganglion cyst of dorsum of right wrist  -     Vital signs; Standing  -     Cleanse with Chlorhexidine (CHG); Standing  -     Diet NPO; Standing  -     Place PAULINA hose; Standing  -     Place sequential compression device; Standing  -     Case Request Operating Room: EXCISION, LESION, TENDON SHEATH, HAND, EXCISION, MASS, EXTREMITY right elbow  -     Full code; Standing  -     Place in Outpatient; Standing    Other orders  -     0.9%  NaCl infusion  -     IP VTE LOW RISK PATIENT; Standing  -     ceFAZolin (ANCEF) 2 g in dextrose 5 % 50 mL IVPB     we once again discussed removing the cyst over the EPL tendon in the operating room.  In addition was found to have a subcutaneous nodule of the right elbow possibly a rheumatoid nodule.  I discussed this would also be removed in the operating room.  I would like for him to obtain an x-ray of the right elbow prior to leaving the clinic today.  We discussed the risks benefits alternatives in detail including the possibility recurrence and continued pain.  He wished to proceed informed consent was obtained the patient was rescheduled for the procedure.

## 2020-08-10 ENCOUNTER — TELEPHONE (OUTPATIENT)
Dept: ORTHOPEDICS | Facility: CLINIC | Age: 70
End: 2020-08-10

## 2020-08-10 NOTE — TELEPHONE ENCOUNTER
Call placed to patient who stated he needed to put his surgery on hold for now because he became homeless over the weekend after being evicted from his apartment. I told patient that we would put it on hold and when he was ready to schedule it again we could get him it back on the schedule.  Patient verbalized understanding.    Layla Rodriguez LPN     ----- Message from Marissa Ybarra sent at 8/10/2020 10:21 AM CDT -----  Regarding: Patient call back  Who called:ANURAG CHANDLER [[id number]]    What is the request in detail: Patient is requesting a call back. He states he would like to cancel the procedure for next week 8/19. He would like to further discuss.   Please advise.    Can the clinic reply by [facility]? No    Best call back number: [phone]    Additional Information: N/A

## (undated) DEVICE — NDL HYPODERMIC BLUNT 18G 1.5IN

## (undated) DEVICE — SOL LAC RINGERS 500CC

## (undated) DEVICE — NDL SAFETY 25G X 1.5 ECLIPSE

## (undated) DEVICE — SYS LABEL CORRECT MED

## (undated) DEVICE — APPLICATOR CHLORAPREP CLR 10.5

## (undated) DEVICE — NDL TUOHY EPIDURAL 20G X 3.5

## (undated) DEVICE — SEE MEDLINE ITEM 153215

## (undated) DEVICE — GLOVE PROTEXIS PI CLASSIC 7.5

## (undated) DEVICE — GLOVE PROTEXIS PI CLASSIC 6.5

## (undated) DEVICE — SYR GLASS 5CC LUER LOK

## (undated) DEVICE — TUBING MINIBORE EXTENSION

## (undated) DEVICE — SYR DISP LL 5CC